# Patient Record
Sex: MALE | Race: WHITE | NOT HISPANIC OR LATINO | Employment: OTHER | URBAN - METROPOLITAN AREA
[De-identification: names, ages, dates, MRNs, and addresses within clinical notes are randomized per-mention and may not be internally consistent; named-entity substitution may affect disease eponyms.]

---

## 2021-02-04 ENCOUNTER — IMMUNIZATION (OUTPATIENT)
Dept: PEDIATRICS | Facility: CLINIC | Age: 81
End: 2021-02-04
Payer: COMMERCIAL

## 2021-02-04 PROCEDURE — 91300 PR COVID VAC PFIZER DIL RECON 30 MCG/0.3 ML IM: CPT

## 2021-02-04 PROCEDURE — 0001A PR COVID VAC PFIZER DIL RECON 30 MCG/0.3 ML IM: CPT

## 2021-02-25 ENCOUNTER — IMMUNIZATION (OUTPATIENT)
Dept: PEDIATRICS | Facility: CLINIC | Age: 81
End: 2021-02-25
Attending: INTERNAL MEDICINE
Payer: COMMERCIAL

## 2021-02-25 PROCEDURE — 0002A PR COVID VAC PFIZER DIL RECON 30 MCG/0.3 ML IM: CPT

## 2021-02-25 PROCEDURE — 91300 PR COVID VAC PFIZER DIL RECON 30 MCG/0.3 ML IM: CPT

## 2021-03-13 ENCOUNTER — HEALTH MAINTENANCE LETTER (OUTPATIENT)
Age: 81
End: 2021-03-13

## 2021-10-23 ENCOUNTER — HEALTH MAINTENANCE LETTER (OUTPATIENT)
Age: 81
End: 2021-10-23

## 2022-04-09 ENCOUNTER — HEALTH MAINTENANCE LETTER (OUTPATIENT)
Age: 82
End: 2022-04-09

## 2022-06-01 ENCOUNTER — TELEPHONE (OUTPATIENT)
Dept: GASTROENTEROLOGY | Facility: CLINIC | Age: 82
End: 2022-06-01
Payer: COMMERCIAL

## 2022-06-01 NOTE — TELEPHONE ENCOUNTER
[facility]    Phone Message    May a detailed message be left on voicemail: yes     Reason for Call: Other:     Jyotsna is calling on behalf of her  Дмитрий for appointment,  Scheduled 11/7/2022.  He has lost 35-40 pounds in the last 2-3 months due to throat issues.  Please reach out to Jyotsna to discuss options.    Action Taken: Message routed to:  [facility] ([facility] gastro    Travel Screening: Not Applicable

## 2022-06-02 NOTE — TELEPHONE ENCOUNTER
Spoke to patient's wife, Jyotsna, over the phone.     Patient was able to get worked in with a GI provider in [place] today. Wife is requesting the appointment with Dr. Webster scheduled for 11/7/22 to stay intact until they learn more at their appointment in [place] today. Wife states she will call back to cancel if the appointment is no longer needed.     Kathy Garcia RN on 6/2/2022 at 9:55 AM

## 2022-06-02 NOTE — TELEPHONE ENCOUNTER
Writer attempted to call patient regarding below. No answer and unable to leave a message due to no voice mail. Writer will attempt to reach patient again at later time.    Maria T Diaz RN on 6/2/2022 at 9:11 AM

## 2022-06-07 ENCOUNTER — TELEPHONE (OUTPATIENT)
Dept: SURGERY | Facility: OTHER | Age: 82
End: 2022-06-07
Payer: COMMERCIAL

## 2022-06-07 ENCOUNTER — HOSPITAL ENCOUNTER (EMERGENCY)
Facility: CLINIC | Age: 82
Discharge: HOME | End: 2022-06-07
Attending: FAMILY MEDICINE | Admitting: FAMILY MEDICINE
Payer: COMMERCIAL

## 2022-06-07 VITALS
RESPIRATION RATE: 18 BRPM | WEIGHT: 187.8 LBS | OXYGEN SATURATION: 98 % | TEMPERATURE: 98.7 F | HEART RATE: 89 BPM | SYSTOLIC BLOOD PRESSURE: 150 MMHG | DIASTOLIC BLOOD PRESSURE: 96 MMHG

## 2022-06-07 DIAGNOSIS — R63.4 WEIGHT LOSS: ICD-10-CM

## 2022-06-07 DIAGNOSIS — K44.9 HIATAL HERNIA: Primary | ICD-10-CM

## 2022-06-07 PROCEDURE — 99283 EMERGENCY DEPT VISIT LOW MDM: CPT

## 2022-06-07 PROCEDURE — 99282 EMERGENCY DEPT VISIT SF MDM: CPT | Performed by: FAMILY MEDICINE

## 2022-06-07 RX ORDER — METOCLOPRAMIDE 5 MG/1
5 TABLET ORAL 4 TIMES DAILY PRN
Qty: 15 TABLET | Refills: 0 | Status: SHIPPED | OUTPATIENT
Start: 2022-06-07 | End: 2022-06-20

## 2022-06-07 NOTE — ED TRIAGE NOTES
"Pt reports he has a \"massive hiatal hernia\" and it is pushing up on his left lung and causing some shortness of breath, he also reports he is being treated for H-pylori infection at this time, pain is only if has gas bubble and is not able to get it out     Triage Assessment     Row Name 06/07/22 1012       Triage Assessment (Adult)    Airway WDL WDL              "

## 2022-06-07 NOTE — TELEPHONE ENCOUNTER
Date of procedure: 6/10  Colonoscopy and EGD  Surgeon: Dr. Peters  Prep:miralax split   Packet:Colonoscopy/EGD instructions emailed to patient  [email or web address]   Date: 6/7/2022    Covid test done in [place] 6/7, will bring results      Surgery Scheduler

## 2022-06-07 NOTE — ED NOTES
Assessment deferred to MD. Patient denies pain or shortness of breath at this time. Discharge reviewed. Patient will return as needed. He is aware of Dr Peters's office calling him with EGD scheduled time.

## 2022-06-07 NOTE — ED PROVIDER NOTES
History     Chief Complaint   Patient presents with     Hernia complication     HPI  Billy Santana is a 81 year old male who presents to the emergency department because of concerns of a recent diagnosis of a large hiatal hernia.  Patient has been lost to care for a while and recently went to [facility] because he cannot get in with anyone any sooner because of concerns of increased belching and feeling like his stomach would not empty any get some discomfort until he can have a large burp.  He went to the [facility] clinic where they did blood work which was normal and an x-ray that showed a very large hiatal hernia.  They recommended for the patient to get set up for an endoscopy and colonoscopy.  Patient is here today because he finds it very inconvenient to go over to [place] and would like to see someone here locally.  He has an appointment set up to establish new care with the primary but does not until Friday.  They are not sure when the patient can get in for other testing and just does not think he can wait that long he was hoping to get that done faster through the emergency department.  Patient states yesterday in the morning he had a lot of discomfort but towards the evening and this morning he is currently pain-free.  At the [facility] clinic they did find that he was H. pylori positive and so they started him on antibiotics and omeprazole.  He has been taking all of these as prescribed.      Summary of clinic visit from 6/2/22:  Yovany Brown MD - 06/02/2022 1:45 PM CDT  Formatting of this note is different from the original.  SUBJECTIVE  Billy is a 81 Y male who is here today for evaluation of not feeling well. Patient is a new patient to the clinic. Has not had much contact with medical providers in the past. States that he is having some issues with his stomach and upper chest area where he has a hard time belching. This makes him not want to eat and get full easily. He has lost about 30  "pounds over the past several weeks. No vomiting. He has had new onset constipation but no blood in the stool or recent dark stools. No pain with swallowing. No difficulty swallowing. Not a smoker. No significant alcohol use.  Denies cough. Denies fever. Denies chills.    OBJECTIVE  /78 (BP Source: R arm, BP position: Sitting)  Pulse 93  Temp 99.5  F (37.5  C)  Ht 72\" (182.9 cm)  Wt 187 lb 9.6 oz (85.1 kg)  SpO2 97%  BMI 25.44 kg/m        ASSESSMENT & PLAN    Unexplained weight loss with general abdominal pain and early satiety. Etiology unclear at this point. We will start with a CBC, comprehensive metabolic, lipase, TSH, CRP, chest x-ray abdomen and pelvis and an H. pylori breath test. Recommended omeprazole which is called in. Recommended also over-the-counter simethicone.  Discussed that we may need to do more testing to determine the cause. Consideration should be given to CT scan of the abdomen pelvis, perhaps chest as well. Also consider EGD and colonoscopy..  He did voice understanding that more testing may be needed to fully determine the cause.  Electronically signed by Yovany Brown MD at 06/02/2022 2:20 PM CDT      Allergies:  Allergies   Allergen Reactions     No Known Drug Allergies        Problem List:    Patient Active Problem List    Diagnosis Date Noted     Health Care Home 12/28/2012     Priority: Cali Beauchamp RN-PHN  FPA / [facility]   [phone]    DX V65.8 REPLACED WITH [id number] HEALTH CARE HOME (04/08/2013)          Past Medical History:    Past Medical History:   Diagnosis Date     Hiatal hernia        Past Surgical History:    History reviewed. No pertinent surgical history.    Family History:    No family history on file.    Social History:  Marital Status:   [2]  Social History     Tobacco Use     Smoking status: Never Smoker     Smokeless tobacco: Never Used   Substance Use Topics     Alcohol use: Yes     Comment: rare     Drug " use: Never        Medications:    No current outpatient medications on file.        Review of Systems   All other systems reviewed and are negative.      Physical Exam   BP: (!) 150/96  Pulse: 89  Temp: 98.7  F (37.1  C)  Resp: 18  Weight: 85.2 kg (187 lb 12.8 oz)  SpO2: 98 %      Physical Exam  Vitals and nursing note reviewed.   Constitutional:       General: He is not in acute distress.     Appearance: He is well-developed. He is not diaphoretic.   HENT:      Head: Normocephalic and atraumatic.   Eyes:      Conjunctiva/sclera: Conjunctivae normal.   Cardiovascular:      Rate and Rhythm: Normal rate and regular rhythm.      Heart sounds: Normal heart sounds. No murmur heard.  Pulmonary:      Effort: Pulmonary effort is normal. No respiratory distress.      Breath sounds: Normal breath sounds. No stridor. No wheezing.      Comments: I can hear bowel sounds in the left chest wall area.  Abdominal:      General: Bowel sounds are normal. There is no distension.      Palpations: Abdomen is soft.      Tenderness: There is no abdominal tenderness. There is no guarding.   Musculoskeletal:         General: Normal range of motion.      Cervical back: Normal range of motion.   Skin:     General: Skin is warm and dry.      Findings: No rash.   Neurological:      Mental Status: He is alert and oriented to person, place, and time.   Psychiatric:         Judgment: Judgment normal.         ED Course        Labs done on 6-22 from [facility]:  BACTERIAL TESTS  [facility]  06/02/2022  Component      Helicobacter Pylori, Breath     Component 06/02/2022       Helicobacter Pylori, Breath Positive Abnormal           AUTOIMMUNE  [facility]  06/02/2022  Component      C-Reactive Protein     Component 06/02/2022       C-Reactive Protein 0.13       CBC           [facility]  06/02/2022  Component      WBC Count, Corrected   RBC Count   Hemoglobin   Hematocrit   MCV   MCH    MCHC   RDW   Platelets   MPV   % Neutrophils   % Lymphocytes   % Monocytes   % Eosinophils   % Basophils   Abs Neutrophils   Abs Lymphocytes   Abs Monocytes   Abs Eosinophils   Abs Basophils     Component 06/02/2022       WBC Count, Corrected 9.4   RBC Count 4.91   Hemoglobin 15.8   Hematocrit 45.9   MCV 93.5   MCH 32.1   MCHC 34.3   RDW 13.4   Platelets 271   MPV 8.7   % Neutrophils 77.0   % Lymphocytes 14.7 Low       % Monocytes 6.9   % Eosinophils 0.5   % Basophils 0.9   Abs Neutrophils 7.2   Abs Lymphocytes 1.4   Abs Monocytes 0.7   Abs Eosinophils 0.0   Abs Basophils 0.1       CHEM PANEL  [facility]  06/02/2022  Component      Sodium   Potassium   Chloride   CO2   Anion Gap   Creatinine   Blood Urea Nitrogen   BUN/Creatinine Ratio   Calcium   Glucose   eGFR   eCrCl (Rx) - Adults   Lipase     Component 06/02/2022 06/02/2022        Sodium 142 --   Potassium 4.5 --   Chloride 107 --   CO2 28 --   Anion Gap 11.5 --   Creatinine 1.06 --   Blood Urea Nitrogen 15.7 --   BUN/Creatinine Ratio 14.81 --   Calcium 9.9 --   Glucose 103 High     --   eGFR >60 --   eCrCl (Rx) - Adults 68.0 --   Lipase -- 49       CHEMISTRY    [facility]  06/02/2022  Component      Alanine Aminotransferase (ALT)   Bilirubin, Total     Component 06/02/2022       Alanine Aminotransferase (ALT) 10   Bilirubin, Total 1.2       LIVER PANEL  [facility]  06/02/2022  Component      Total Protein   Albumin   Globulin   Albumin/Globulin Ratio   Aspartate Aminotransferase (AST)   Alanine Aminotransferase (ALT)   Alkaline Phosphatase   Bilirubin, Total     Component 06/02/2022       Total Protein 7.2   Albumin 4.3   Globulin 2.9   Albumin/Globulin Ratio 1.5   Aspartate Aminotransferase (AST) 14   Alanine Aminotransferase (ALT) 10   Alkaline Phosphatase 89   Bilirubin, Total 1.2       THYROID  [facility]  06/02/2022  Component      TSH     Component 06/02/2022        TSH 1.35     EXAM:   XR CHEST PA AND LAT     INDICATION:   Unexplained weight loss     COMPARISON:   None.     FINDINGS:   PA and lateral views of the chest demonstrate hyperinflation of the lungs.   There is near complete position angle of the stomach in the thoracic cavity   with a large air-fluid level in the hiatal hernia.  No pleural air or fluid is   seen.  No focal pneumonia or suspicious pulmonary nodules are identified.   Thoracic aortic calcifications are present.     IMPRESSION:   Very large hiatal hernia.  Emphysematous changes.    Procedure Note    Delisa Damon MD - 06/04/2022   Formatting of this note might be different from the original.   EXAM:   XR CHEST PA AND LAT     INDICATION:   Unexplained weight loss     COMPARISON:   None.     FINDINGS:   PA and lateral views of the chest demonstrate hyperinflation of the lungs.   There is near complete position angle of the stomach in the thoracic cavity   with a large air-fluid level in the hiatal hernia.  No pleural air or fluid is   seen.  No focal pneumonia or suspicious pulmonary nodules are identified.   Thoracic aortic calcifications are present.     IMPRESSION:   Very large hiatal hernia.  Emphysematous changes.  Exam End: 06/02/22  3:09 PM    Specimen Collected: 06/04/22 10:16 AM Last Resulted: 06/04/22 10:17 AM   [facility]  Result Received: 06/07/22  9:51 AM                 Procedures      No results found for this or any previous visit (from the past 24 hour(s)).    Medications - No data to display     Labs and imaging were imported into this note from patient's visit from 5 days ago.  It does show a very large hiatal hernia, labs were normal other than a positive H. pylori infection.  Patient is currently pain-free.  I spoke to general surgery here, Dr. Peters, he will get the patient in here locally for an endoscopy and colonoscopy, should be able to be done here pretty soon.  At this point we  will discharge the patient home and add Reglan that he can use which may help with gastric emptying and some of his symptoms.  He will continue on his antibiotics for the H. pylori treatment and the omeprazole.  Patient has appoint with his primary care doctor at the end of the week.  According to surgery, we did not need to do a CT scan emergently but primary may want to consider this as an outpatient for further work-up for this unexplained weight loss.  There are no emergent medical conditions to be treated at this time patient safe to be discharged home.    Assessments & Plan (with Medical Decision Making)  Hiatal hernia     I have reviewed the nursing notes.    I have reviewed the findings, diagnosis, plan and need for follow up with the patient.              6/7/2022   [facility] EMERGENCY DEPT     Andre Fisher MD  06/07/22 1283

## 2022-06-07 NOTE — DISCHARGE INSTRUCTIONS
1.  Continue to eat very small, frequent meals.  You can use the Reglan if you get this abdominal pressure and bloating again to see if this helps with gastric emptying and your symptoms.  2.  Someone from Dr. Peters's office should be contacting you today or tomorrow with the time and date for your endoscopy and colonoscopy.  If you do not hear from them, please call their office tomorrow.

## 2022-06-07 NOTE — TELEPHONE ENCOUNTER
Patients wife Jyotsna returned call. I was unsure how to proceed based on the information I had but I told her it appeared they were fitting patient in on 6/10 for procedures but I would need to confirm. Sent message to Jazmin YOO

## 2022-06-08 ENCOUNTER — MYC MEDICAL ADVICE (OUTPATIENT)
Dept: GASTROENTEROLOGY | Facility: CLINIC | Age: 82
End: 2022-06-08
Payer: COMMERCIAL

## 2022-06-08 RX ORDER — CLARITHROMYCIN 500 MG
500 TABLET ORAL 2 TIMES DAILY
COMMUNITY
Start: 2022-06-03 | End: 2022-06-22

## 2022-06-08 RX ORDER — AMOXICILLIN 500 MG/1
1000 CAPSULE ORAL 2 TIMES DAILY
COMMUNITY
Start: 2022-06-03 | End: 2022-06-22

## 2022-06-09 ENCOUNTER — HOSPITAL ENCOUNTER (EMERGENCY)
Facility: CLINIC | Age: 82
Discharge: HOME | End: 2022-06-10
Attending: FAMILY MEDICINE | Admitting: FAMILY MEDICINE
Payer: COMMERCIAL

## 2022-06-09 ENCOUNTER — NURSE TRIAGE (OUTPATIENT)
Dept: NURSING | Facility: CLINIC | Age: 82
End: 2022-06-09
Payer: COMMERCIAL

## 2022-06-09 ENCOUNTER — HOSPITAL ENCOUNTER (EMERGENCY)
Facility: CLINIC | Age: 82
Discharge: HOME | End: 2022-06-09
Attending: EMERGENCY MEDICINE | Admitting: EMERGENCY MEDICINE
Payer: COMMERCIAL

## 2022-06-09 VITALS
SYSTOLIC BLOOD PRESSURE: 128 MMHG | HEIGHT: 72 IN | DIASTOLIC BLOOD PRESSURE: 83 MMHG | WEIGHT: 187 LBS | BODY MASS INDEX: 25.33 KG/M2 | OXYGEN SATURATION: 97 % | HEART RATE: 91 BPM | TEMPERATURE: 98.7 F | RESPIRATION RATE: 19 BRPM

## 2022-06-09 DIAGNOSIS — R31.0 GROSS HEMATURIA: ICD-10-CM

## 2022-06-09 DIAGNOSIS — R33.9 URINARY RETENTION: ICD-10-CM

## 2022-06-09 DIAGNOSIS — R55 VASOVAGAL NEAR SYNCOPE: ICD-10-CM

## 2022-06-09 LAB
ALBUMIN SERPL-MCNC: 3.8 G/DL (ref 3.4–5)
ALBUMIN UR-MCNC: 100 MG/DL
ALBUMIN UR-MCNC: NEGATIVE MG/DL
ALP SERPL-CCNC: 82 U/L (ref 40–150)
ALT SERPL W P-5'-P-CCNC: 18 U/L (ref 0–70)
ANION GAP SERPL CALCULATED.3IONS-SCNC: 10 MMOL/L (ref 3–14)
APPEARANCE UR: CLEAR
APPEARANCE UR: CLEAR
AST SERPL W P-5'-P-CCNC: 28 U/L (ref 0–45)
BACTERIA #/AREA URNS HPF: ABNORMAL /HPF
BASOPHILS # BLD AUTO: 0.1 10E3/UL (ref 0–0.2)
BASOPHILS NFR BLD AUTO: 0 %
BILIRUB SERPL-MCNC: 1.8 MG/DL (ref 0.2–1.3)
BILIRUB UR QL STRIP: NEGATIVE
BILIRUB UR QL STRIP: NEGATIVE
BUN SERPL-MCNC: 14 MG/DL (ref 7–30)
CALCIUM SERPL-MCNC: 8.8 MG/DL (ref 8.5–10.1)
CHLORIDE BLD-SCNC: 106 MMOL/L (ref 94–109)
CO2 SERPL-SCNC: 23 MMOL/L (ref 20–32)
COLOR UR AUTO: ABNORMAL
COLOR UR AUTO: YELLOW
CREAT SERPL-MCNC: 1.14 MG/DL (ref 0.66–1.25)
EOSINOPHIL # BLD AUTO: 0.1 10E3/UL (ref 0–0.7)
EOSINOPHIL NFR BLD AUTO: 1 %
ERYTHROCYTE [DISTWIDTH] IN BLOOD BY AUTOMATED COUNT: 12.3 % (ref 10–15)
GFR SERPL CREATININE-BSD FRML MDRD: 65 ML/MIN/1.73M2
GLUCOSE BLD-MCNC: 124 MG/DL (ref 70–99)
GLUCOSE UR STRIP-MCNC: 50 MG/DL
GLUCOSE UR STRIP-MCNC: NEGATIVE MG/DL
HCT VFR BLD AUTO: 44.8 % (ref 40–53)
HGB BLD-MCNC: 15.7 G/DL (ref 13.3–17.7)
HGB UR QL STRIP: ABNORMAL
HGB UR QL STRIP: NEGATIVE
IMM GRANULOCYTES # BLD: 0 10E3/UL
IMM GRANULOCYTES NFR BLD: 0 %
KETONES UR STRIP-MCNC: 20 MG/DL
KETONES UR STRIP-MCNC: 5 MG/DL
LACTATE SERPL-SCNC: 1.7 MMOL/L (ref 0.7–2)
LEUKOCYTE ESTERASE UR QL STRIP: NEGATIVE
LEUKOCYTE ESTERASE UR QL STRIP: NEGATIVE
LYMPHOCYTES # BLD AUTO: 2 10E3/UL (ref 0.8–5.3)
LYMPHOCYTES NFR BLD AUTO: 14 %
MCH RBC QN AUTO: 32.1 PG (ref 26.5–33)
MCHC RBC AUTO-ENTMCNC: 35 G/DL (ref 31.5–36.5)
MCV RBC AUTO: 92 FL (ref 78–100)
MONOCYTES # BLD AUTO: 1.2 10E3/UL (ref 0–1.3)
MONOCYTES NFR BLD AUTO: 8 %
MUCOUS THREADS #/AREA URNS LPF: PRESENT /LPF
MUCOUS THREADS #/AREA URNS LPF: PRESENT /LPF
NEUTROPHILS # BLD AUTO: 10.4 10E3/UL (ref 1.6–8.3)
NEUTROPHILS NFR BLD AUTO: 77 %
NITRATE UR QL: NEGATIVE
NITRATE UR QL: NEGATIVE
NRBC # BLD AUTO: 0 10E3/UL
NRBC BLD AUTO-RTO: 0 /100
PH UR STRIP: 5 [PH] (ref 5–7)
PH UR STRIP: 6 [PH] (ref 5–7)
PLATELET # BLD AUTO: 329 10E3/UL (ref 150–450)
POTASSIUM BLD-SCNC: 4.1 MMOL/L (ref 3.4–5.3)
PROT SERPL-MCNC: 7.1 G/DL (ref 6.8–8.8)
RBC # BLD AUTO: 4.89 10E6/UL (ref 4.4–5.9)
RBC URINE: 1 /HPF
RBC URINE: >182 /HPF
SODIUM SERPL-SCNC: 139 MMOL/L (ref 133–144)
SP GR UR STRIP: 1.01 (ref 1–1.03)
SP GR UR STRIP: 1.01 (ref 1–1.03)
SQUAMOUS EPITHELIAL: <1 /HPF
UROBILINOGEN UR STRIP-MCNC: NORMAL MG/DL
UROBILINOGEN UR STRIP-MCNC: NORMAL MG/DL
WBC # BLD AUTO: 13.7 10E3/UL (ref 4–11)
WBC URINE: 1 /HPF
WBC URINE: 8 /HPF

## 2022-06-09 PROCEDURE — 99284 EMERGENCY DEPT VISIT MOD MDM: CPT | Mod: 25 | Performed by: EMERGENCY MEDICINE

## 2022-06-09 PROCEDURE — 85025 COMPLETE CBC W/AUTO DIFF WBC: CPT | Performed by: FAMILY MEDICINE

## 2022-06-09 PROCEDURE — 99284 EMERGENCY DEPT VISIT MOD MDM: CPT | Performed by: FAMILY MEDICINE

## 2022-06-09 PROCEDURE — 81001 URINALYSIS AUTO W/SCOPE: CPT | Performed by: EMERGENCY MEDICINE

## 2022-06-09 PROCEDURE — 51702 INSERT TEMP BLADDER CATH: CPT | Performed by: EMERGENCY MEDICINE

## 2022-06-09 PROCEDURE — 81001 URINALYSIS AUTO W/SCOPE: CPT | Performed by: FAMILY MEDICINE

## 2022-06-09 PROCEDURE — 36415 COLL VENOUS BLD VENIPUNCTURE: CPT | Performed by: FAMILY MEDICINE

## 2022-06-09 PROCEDURE — 99284 EMERGENCY DEPT VISIT MOD MDM: CPT | Performed by: EMERGENCY MEDICINE

## 2022-06-09 PROCEDURE — 51798 US URINE CAPACITY MEASURE: CPT | Performed by: EMERGENCY MEDICINE

## 2022-06-09 PROCEDURE — 250N000009 HC RX 250: Performed by: EMERGENCY MEDICINE

## 2022-06-09 PROCEDURE — 258N000003 HC RX IP 258 OP 636: Performed by: FAMILY MEDICINE

## 2022-06-09 PROCEDURE — 80053 COMPREHEN METABOLIC PANEL: CPT | Performed by: FAMILY MEDICINE

## 2022-06-09 PROCEDURE — 99283 EMERGENCY DEPT VISIT LOW MDM: CPT | Mod: 25,27 | Performed by: FAMILY MEDICINE

## 2022-06-09 PROCEDURE — 96360 HYDRATION IV INFUSION INIT: CPT | Performed by: FAMILY MEDICINE

## 2022-06-09 PROCEDURE — 83605 ASSAY OF LACTIC ACID: CPT | Performed by: FAMILY MEDICINE

## 2022-06-09 RX ORDER — LIDOCAINE HYDROCHLORIDE 20 MG/ML
JELLY TOPICAL ONCE
Status: COMPLETED | OUTPATIENT
Start: 2022-06-09 | End: 2022-06-09

## 2022-06-09 RX ORDER — SODIUM CHLORIDE 9 MG/ML
INJECTION, SOLUTION INTRAVENOUS CONTINUOUS
Status: DISCONTINUED | OUTPATIENT
Start: 2022-06-09 | End: 2022-06-10 | Stop reason: HOSPADM

## 2022-06-09 RX ADMIN — SODIUM CHLORIDE 1000 ML: 9 INJECTION, SOLUTION INTRAVENOUS at 22:25

## 2022-06-09 RX ADMIN — LIDOCAINE HYDROCHLORIDE: 20 JELLY TOPICAL at 16:16

## 2022-06-09 ASSESSMENT — ENCOUNTER SYMPTOMS
FEVER: 0
ABDOMINAL PAIN: 0
HEMATURIA: 1

## 2022-06-09 NOTE — H&P
[facility] History and Physical    Billy Santana MRN# [id number]   Age: 81 year old YOB: 1940     Date of Admission:  (Not on file)    Home clinic: [facility]  Primary care provider: No Ref-Primary, Physician          Impression and Plan:   Impression:   Hiatal hernia [K44.9]  Weight loss [R63.4]  No prior endo in system      Plan:   Proceed to EGD and Colonoscopy as planned.  The procedure, risks(bleeding, perforation), benefits and alternatives were discussed and the patient agrees to proceed. Cleared for Anesthesia             Chief Complaint:   Hiatal hernia [K44.9]  Weight loss [R63.4]    History is obtained from the patient          History of Present Illness:   This 81 year old male is being seen at this time for evaluation for chest pain. Dx hiatal hernia.  Lost weight - 40 lbs last 2 months.  Able to eat until 2 months ago.  Has problems belching.  No prior colonoscopy.  On tx for H pylori.              Past Medical History:     Past Medical History:   Diagnosis Date     Hiatal hernia             Past Surgical History:   History reviewed. No pertinent surgical history.         Social History:     Social History     Tobacco Use     Smoking status: Never Smoker     Smokeless tobacco: Never Used   Substance Use Topics     Alcohol use: Yes     Comment: rare            Family History:   History reviewed. No pertinent family history.         Immunizations:     VACCINE/DOSE   Diptheria   DPT   DTAP   HBIG   Hepatitis A   Hepatitis B   HIB   Influenza   Measles   Meningococcal   MMR   Mumps   Pneumococcal   Polio   Rubella   Small Pox   TDAP   Varicella   Zoster            Allergies:     Allergies   Allergen Reactions     No Known Drug Allergies             Medications:     No current facility-administered medications for this encounter.     Current Outpatient Medications   Medication Sig     amoxicillin (AMOXIL) 500 MG capsule TAKE 2 CAPSULES BY MOUTH ONCE DAILY IN  THE MORNING AND 2 ONCE DAILY IN THE EVENING FOR 14 DAYS     clarithromycin (BIAXIN) 500 MG tablet TAKE 1 TABLET BY MOUTH EVERY 12 HOURS FOR 14 DAYS     metoclopramide (REGLAN) 5 MG tablet Take 1 tablet (5 mg) by mouth 4 times daily as needed (nausea or bloating)     omeprazole (PRILOSEC) 20 MG DR capsule TAKE 1 CAPSULE (20 MG) BY MOUTH ONCE DAILY BEFORE BREAKFAST             Review of Systems:   The review of systems was positive for the following findings.  None.  The remainder of the review of systems was unremarkable.          Physical Exam:   All vitals have been reviewed  There were no vitals taken for this visit.  No intake or output data in the 24 hours ending 06/09/22 1010  SHEENT examination revealed NC/AT EOMI.  Examination of the chest revealed CTA.  Examination of the heart revealed RRR.  Examination of the abdomen revealed soft, non tender.  The neuromuscular examination was None.          Data:   All laboratory data reviewed  No results found for any visits on 06/10/22.       Andres Peters MD, FACS

## 2022-06-09 NOTE — ED TRIAGE NOTES
Is voiding but states he is having trouble voiding. States he has a colonoscopy tomorrow concerned he wont be able to hold the amount during surgery and will damage his bladder.  Feels like he has to pee urgently when he does

## 2022-06-09 NOTE — TELEPHONE ENCOUNTER
Huddled with Dr. Lock, who said patient needs to go to the emergency room, as it sounds like he needs a catheter.   Spoke to patient and gave message. He said okay and he will go to the ER.

## 2022-06-09 NOTE — DISCHARGE INSTRUCTIONS
Sahu catheter until you see your doctor next week.  Urine does not look infected.  I spoke to Dr. Peters and he states he can still proceed with the colonoscopy tomorrow so you should start the bowel prep tonight.

## 2022-06-09 NOTE — ED PROVIDER NOTES
History     Chief Complaint   Patient presents with     Urinary Retention     HPI  Billy Santana is a 81 year old male who presents with concerns for possible urinary retention.  Patient states he has problems where he does not think he is completely emptying his bladder.  He feels the urgency to go but does not feel he completely empties thereafter.  He has not noticed any blood in the urine and is not painful to urinate.  No flank pain.  No history of pyelonephritis or kidney stone.  He is unaware of prostatic hypertrophy and states he used to be checked for this every year but its been a number of years since they have checked him.  Recently he developed upper abdominal discomfort with anorexia.  Thought to have H. pylori and was placed on amoxicillin, Bactrim, and PPI.  He is due to have colonoscopy tomorrow and was concerned about his bowel prep causing worsening urinary retention.  He denies fever or chills.  No lightheadedness or generalized weakness.    Allergies:  Allergies   Allergen Reactions     No Known Drug Allergies        Problem List:    Patient Active Problem List    Diagnosis Date Noted     Health Care Home 12/28/2012     Priority: Cali Beauchamp RN-PHN  [facility] / [facility]   [phone]    DX V65.8 REPLACED WITH [id number] HEALTH CARE HOME (04/08/2013)          Past Medical History:    Past Medical History:   Diagnosis Date     Hiatal hernia        Past Surgical History:    No past surgical history on file.    Family History:    No family history on file.    Social History:  Marital Status:   [2]  Social History     Tobacco Use     Smoking status: Never Smoker     Smokeless tobacco: Never Used   Substance Use Topics     Alcohol use: Yes     Comment: rare     Drug use: Never        Medications:    amoxicillin (AMOXIL) 500 MG capsule  clarithromycin (BIAXIN) 500 MG tablet  metoclopramide (REGLAN) 5 MG tablet  omeprazole (PRILOSEC) 20 MG   capsule          Review of Systems all other systems are reviewed and are negative.    Physical Exam   BP: 128/83  Pulse: 91  Temp: 98.7  F (37.1  C)  Resp: 19  Height: 182.9 cm (6')  Weight: 84.8 kg (187 lb)  SpO2: 97 %      Physical Exam General Pleasant cooperative male who does not look toxic or ill.  He does not have CVA tenderness.  His not overly distended on abdominal exam without organomegaly.    ED Course                 Procedures              Critical Care time:  none               Results for orders placed or performed during the hospital encounter of 06/09/22 (from the past 24 hour(s))   UA with Microscopic reflex to Culture    Specimen: Urine, Clean Catch   Result Value Ref Range    Color Urine Yellow Colorless, Straw, Light Yellow, Yellow    Appearance Urine Clear Clear    Glucose Urine Negative Negative mg/dL    Bilirubin Urine Negative Negative    Ketones Urine 20  (A) Negative mg/dL    Specific Gravity Urine 1.013 1.003 - 1.035    Blood Urine Negative Negative    pH Urine 6.0 5.0 - 7.0    Protein Albumin Urine Negative Negative mg/dL    Urobilinogen Urine Normal Normal, 2.0 mg/dL    Nitrite Urine Negative Negative    Leukocyte Esterase Urine Negative Negative    Mucus Urine Present (A) None Seen /LPF    RBC Urine 1 <=2 /HPF    WBC Urine 1 <=5 /HPF    Squamous Epithelials Urine <1 <=1 /HPF    Narrative    Urine Culture not indicated       Medications   lidocaine (XYLOCAINE) 2 % external gel ( Topical Given 6/9/22 1616)     Was able to urinate a small amount.  Bladder scan thereafter showed almost 300 cc so a catheter was placed at his request.  I did speak to the surgeon that is going to perform his colonoscopy and he states that is okay to proceed with a colonoscopy and prep.  His urine was clear and did not look infectious.  Assessments & Plan (with Medical Decision Making)   Billy Santana is a 81 year old male who presents with concerns for possible urinary retention.  Patient states he has  problems where he does not think he is completely emptying his bladder.  He feels the urgency to go but does not feel he completely empties thereafter.  He has not noticed any blood in the urine and is not painful to urinate.  No flank pain.  No history of pyelonephritis or kidney stone.  He is unaware of prostatic hypertrophy and states he used to be checked for this every year but its been a number of years since they have checked him.  Recently he developed upper abdominal discomfort with anorexia.  Thought to have H. pylori and was placed on amoxicillin, Bactrim, and PPI.  He is due to have colonoscopy tomorrow and was concerned about his bowel prep causing worsening urinary retention.  He denies fever or chills.  No lightheadedness or generalized weakness.  On presentation patient was afebrile and vitally stable.  Did not have CVA tenderness and his abdominal exam did not reveal organomegaly or distended bladder.  He did however had some urinary retention.  His urine was clear of infection.  Catheter was placed at his request.  I spoke to the surgeon that performed his colonoscopy and is okay for him to proceed with a colonoscopy and prep.  Reasons to return to the ER were discussed.  I have reviewed the nursing notes.    I have reviewed the findings, diagnosis, plan and need for follow up with the patient.       Discharge Medication List as of 6/9/2022  4:10 PM          Final diagnoses:   Urinary retention       6/9/2022   [facility] EMERGENCY DEPT     Ernesto Mendoza MD  06/09/22 2607

## 2022-06-09 NOTE — ED NOTES
Pt tolerated cath appropriately. Urine returned 500 ML. RN educated pt and wife about cath cares - provided leg bag to pt and educated. Sahu back to take home to use at night - educated about use of both bags and cath care. Pt denies pain or discomfort.

## 2022-06-09 NOTE — TELEPHONE ENCOUNTER
"      Reason for Disposition    Unable to urinate (or only a few drops) and bladder feels very full    Additional Information    Negative: Shock suspected (e.g., cold/pale/clammy skin, too weak to stand, low BP, rapid pulse)    Negative: Sounds like a life-threatening emergency to the triager    Answer Assessment - Initial Assessment Questions  1. SEVERITY: \"How bad is the pain?\"  (e.g., Scale 1-10; mild, moderate, or severe)    - MILD (1-3):             Pain is from bladder fullness  2. FREQUENCY: \"How many times have you had painful urination today?\"      Every 2 hours - trouble initiating, and does not empty  3. PATTERN: \"Is pain present every time you urinate or just sometimes?\"       Pain is full bladder  4. ONSET: \"When did the painful urination start?\"       3-4 days  5. FEVER: \"Do you have a fever?\" If so, ask: \"What is your temperature, how was it measured, and when did it start?\"   No fever nor chills  6. PAST UTI: \"Have you had a urine infection before?\" If so, ask: \"When was the last time?\" and \"What happened that time?\"       This issue has not been a problem before  7. CAUSE: \"What do you think is causing the painful urination?\"    Not sure why I can't empty my bladder  8. OTHER SYMPTOMS: \"Do you have any other symptoms?\" (e.g., flank pain, penile discharge, scrotal pain, blood in urine)     No discharge, no blood in urine, urine color is light yellow. Initial symptoms did start after \" sexy dream\",3 days ago- he relates that the dream was about his wife. He has no penile nor scrotal pain.    Answer Assessment - Initial Assessment Questions  1. SYMPTOM: \"What's the main symptom you're concerned about?\" (e.g., frequency, incontinence)      Inability to empty bladder  2. ONSET:  3 days ago  3. PAIN: \"Is there any pain?\"Mild/ fullness  4. CAUSE: \"What do you think is causing the symptoms?\"   Unknown  5. OTHER SYMPTOMS: \"Do you have any other symptoms?\" (e.g., fever, flank pain, blood in urine, pain with " "urination)  NO    Protocols used: URINATION PAIN - MALE-A-OH, URINARY SYMPTOMS-A-OH  Nurse Triage SBAR    Is this a 2nd Level Triage? YES, LICENSED PRACTITIONER REVIEW IS REQUIRED    Situation:Never before had this issue:  - 3 days ago he feels like he cannot completely void/ empty bladder.  -He needs to have activity to get urine stream initiated  - states\" I have typical old man prostate issues, but this is different\".  -He is worried about completing colonoscopy.  -No fever, no chills.  -On antibiotics for recent stomach issues> H pylori.  Amoxicillin and Biaxin.      Background:  Seen in ED [facility] 6/7/22>very large hiatal hernia, labs were normal other than a positive H. pylori infection.   Current c/o: 3 day hx of decreasing ability to empty bladder without accompanying fever, chills, hematuria or dysuria.  Assessment:See today in office with PCP permission or Ed if PCP unable to manage.    Protocol Recommended Disposition:   Go To ED/UCC Now (Or To Office With PCP Approval)    Recommendation: Appt in Primary care vs ED     Routed to provider team high priority    Does the patient meet one of the following criteria for ADS visit consideration? No    "

## 2022-06-10 ENCOUNTER — TELEPHONE (OUTPATIENT)
Dept: UROLOGY | Facility: CLINIC | Age: 82
End: 2022-06-10

## 2022-06-10 ENCOUNTER — HOSPITAL ENCOUNTER (OUTPATIENT)
Facility: CLINIC | Age: 82
Discharge: HOME | End: 2022-06-10
Attending: SPECIALIST | Admitting: SPECIALIST
Payer: COMMERCIAL

## 2022-06-10 ENCOUNTER — ANESTHESIA EVENT (OUTPATIENT)
Dept: GASTROENTEROLOGY | Facility: CLINIC | Age: 82
End: 2022-06-10
Payer: COMMERCIAL

## 2022-06-10 ENCOUNTER — ANESTHESIA (OUTPATIENT)
Dept: GASTROENTEROLOGY | Facility: CLINIC | Age: 82
End: 2022-06-10
Payer: COMMERCIAL

## 2022-06-10 VITALS
HEART RATE: 74 BPM | TEMPERATURE: 98.1 F | RESPIRATION RATE: 18 BRPM | DIASTOLIC BLOOD PRESSURE: 77 MMHG | OXYGEN SATURATION: 95 % | BODY MASS INDEX: 25.36 KG/M2 | WEIGHT: 187 LBS | SYSTOLIC BLOOD PRESSURE: 119 MMHG

## 2022-06-10 VITALS
TEMPERATURE: 98.3 F | DIASTOLIC BLOOD PRESSURE: 49 MMHG | RESPIRATION RATE: 16 BRPM | SYSTOLIC BLOOD PRESSURE: 93 MMHG | HEART RATE: 67 BPM | OXYGEN SATURATION: 95 %

## 2022-06-10 DIAGNOSIS — K44.9 HIATAL HERNIA: ICD-10-CM

## 2022-06-10 DIAGNOSIS — R63.4 WEIGHT LOSS: ICD-10-CM

## 2022-06-10 LAB
COLONOSCOPY: NORMAL
UPPER GI ENDOSCOPY: NORMAL

## 2022-06-10 PROCEDURE — 88305 TISSUE EXAM BY PATHOLOGIST: CPT | Mod: 26 | Performed by: PATHOLOGY

## 2022-06-10 PROCEDURE — 258N000003 HC RX IP 258 OP 636: Performed by: NURSE ANESTHETIST, CERTIFIED REGISTERED

## 2022-06-10 PROCEDURE — 45385 COLONOSCOPY W/LESION REMOVAL: CPT | Mod: PT | Performed by: SPECIALIST

## 2022-06-10 PROCEDURE — 250N000011 HC RX IP 250 OP 636: Performed by: NURSE ANESTHETIST, CERTIFIED REGISTERED

## 2022-06-10 PROCEDURE — 250N000009 HC RX 250: Performed by: NURSE ANESTHETIST, CERTIFIED REGISTERED

## 2022-06-10 PROCEDURE — 370N000017 HC ANESTHESIA TECHNICAL FEE, PER MIN: Performed by: SPECIALIST

## 2022-06-10 PROCEDURE — 43239 EGD BIOPSY SINGLE/MULTIPLE: CPT | Performed by: SPECIALIST

## 2022-06-10 PROCEDURE — 45380 COLONOSCOPY AND BIOPSY: CPT | Performed by: SPECIALIST

## 2022-06-10 PROCEDURE — 88342 IMHCHEM/IMCYTCHM 1ST ANTB: CPT | Mod: 26 | Performed by: PATHOLOGY

## 2022-06-10 PROCEDURE — 88305 TISSUE EXAM BY PATHOLOGIST: CPT | Mod: TC | Performed by: SPECIALIST

## 2022-06-10 PROCEDURE — 45385 COLONOSCOPY W/LESION REMOVAL: CPT | Performed by: SPECIALIST

## 2022-06-10 PROCEDURE — 43239 EGD BIOPSY SINGLE/MULTIPLE: CPT | Mod: 51 | Performed by: SPECIALIST

## 2022-06-10 PROCEDURE — 93005 ELECTROCARDIOGRAM TRACING: CPT | Mod: XU

## 2022-06-10 RX ORDER — SODIUM CHLORIDE, SODIUM LACTATE, POTASSIUM CHLORIDE, CALCIUM CHLORIDE 600; 310; 30; 20 MG/100ML; MG/100ML; MG/100ML; MG/100ML
INJECTION, SOLUTION INTRAVENOUS CONTINUOUS
Status: DISCONTINUED | OUTPATIENT
Start: 2022-06-10 | End: 2022-06-10 | Stop reason: HOSPADM

## 2022-06-10 RX ORDER — LIDOCAINE HYDROCHLORIDE 20 MG/ML
INJECTION, SOLUTION INFILTRATION; PERINEURAL PRN
Status: DISCONTINUED | OUTPATIENT
Start: 2022-06-10 | End: 2022-06-10

## 2022-06-10 RX ORDER — PROPOFOL 10 MG/ML
INJECTION, EMULSION INTRAVENOUS CONTINUOUS PRN
Status: DISCONTINUED | OUTPATIENT
Start: 2022-06-10 | End: 2022-06-10

## 2022-06-10 RX ORDER — PROPOFOL 10 MG/ML
INJECTION, EMULSION INTRAVENOUS PRN
Status: DISCONTINUED | OUTPATIENT
Start: 2022-06-10 | End: 2022-06-10

## 2022-06-10 RX ORDER — LIDOCAINE 40 MG/G
CREAM TOPICAL
Status: DISCONTINUED | OUTPATIENT
Start: 2022-06-10 | End: 2022-06-10

## 2022-06-10 RX ORDER — LIDOCAINE 40 MG/G
CREAM TOPICAL
Status: DISCONTINUED | OUTPATIENT
Start: 2022-06-10 | End: 2022-06-10 | Stop reason: HOSPADM

## 2022-06-10 RX ADMIN — PROPOFOL 150 MCG/KG/MIN: 10 INJECTION, EMULSION INTRAVENOUS at 10:03

## 2022-06-10 RX ADMIN — PROPOFOL 60 MG: 10 INJECTION, EMULSION INTRAVENOUS at 10:03

## 2022-06-10 RX ADMIN — SODIUM CHLORIDE, POTASSIUM CHLORIDE, SODIUM LACTATE AND CALCIUM CHLORIDE: 600; 310; 30; 20 INJECTION, SOLUTION INTRAVENOUS at 09:53

## 2022-06-10 RX ADMIN — LIDOCAINE HYDROCHLORIDE 60 MG: 20 INJECTION, SOLUTION INFILTRATION; PERINEURAL at 10:03

## 2022-06-10 ASSESSMENT — ENCOUNTER SYMPTOMS: DYSRHYTHMIAS: 1

## 2022-06-10 NOTE — ED NOTES
Reviewed discharge instruction, verbalized understanding. No questions or concerns. IV removed. VS reassessed.

## 2022-06-10 NOTE — ED NOTES
Pt wife out to RN desk and states pt was feeling nauseated. RN to pt room and pt was very diaphoretic, c/o nausea, sweating, pale, 'felt like I was going to pass out'. Pt has been up x2 to have a BM. Pt stated there looked like there was blood in stool but continued to clean bottom and was having bright red blood with wiping, hx of hemorrhoid. BP hypotensive. RN started IV, blood drawn and sent to lab. VS monitoring. IV fluid bolus infusing.

## 2022-06-10 NOTE — ED PROVIDER NOTES
History     Chief Complaint   Patient presents with     Catheter Problem     HPI  Billy Santana is a 81 year old male who presents to the ED tonight with gross hematuria.  He was seen earlier in the day with urinary retention and had a catheter placed.  There was no sign of infection.  Nursing placed the catheter states that there was a's small clot that came out right away but then the urine was clear.  He drained about 1500 cc.  He just started his colonoscopy prep this evening but has not had any results as of yet.  Having both upper and lower endoscopies tomorrow.  He denies any abdominal or flank pain.  No nausea or vomiting.    Here with his wife.  Lives in [place], about 20 miles away.    Allergies:  Allergies   Allergen Reactions     No Known Drug Allergies        Problem List:    Patient Active Problem List    Diagnosis Date Noted     Health Care Home 12/28/2012     Priority: Cali Beauchamp RN-PHN  FPA / [facility]   [phone]    DX V65.8 REPLACED WITH [id number] C[facility] HOME (04/08/2013)          Past Medical History:    Past Medical History:   Diagnosis Date     Hiatal hernia        Past Surgical History:    History reviewed. No pertinent surgical history.    Family History:    History reviewed. No pertinent family history.    Social History:  Marital Status:   [2]  Social History     Tobacco Use     Smoking status: Never Smoker     Smokeless tobacco: Never Used   Substance Use Topics     Alcohol use: Yes     Comment: rare     Drug use: Never        Medications:    amoxicillin (AMOXIL) 500 MG capsule  clarithromycin (BIAXIN) 500 MG tablet  metoclopramide (REGLAN) 5 MG tablet  omeprazole (PRILOSEC) 20 MG DR capsule          Review of Systems   Constitutional: Negative for fever.   Gastrointestinal: Negative for abdominal pain.   Genitourinary: Positive for hematuria.       Physical Exam   BP: 133/83  Pulse: 101  Temp: 98.1  F (36.7  C)  Resp: 18  Weight:  84.8 kg (187 lb)  SpO2: 95 %      Physical Exam  Constitutional:       General: He is not in acute distress.     Appearance: Normal appearance.   Pulmonary:      Effort: Pulmonary effort is normal. No respiratory distress.   Abdominal:      General: Bowel sounds are normal. There is no distension.      Palpations: Abdomen is soft.      Tenderness: There is no abdominal tenderness.   Genitourinary:     Comments: Sahu catheter in place.  Hematuria in the bag.  No clots noted  Neurological:      General: No focal deficit present.      Mental Status: He is alert and oriented to person, place, and time.   Psychiatric:         Mood and Affect: Mood normal.         ED Course            Results for orders placed or performed during the hospital encounter of 06/09/22 (from the past 24 hour(s))   UA with Microscopic reflex to Culture    Specimen: Urine, Catheter   Result Value Ref Range    Color Urine Ivett (A) Colorless, Straw, Light Yellow, Yellow    Appearance Urine Clear Clear    Glucose Urine 50  (A) Negative mg/dL    Bilirubin Urine Negative Negative    Ketones Urine 5  (A) Negative mg/dL    Specific Gravity Urine 1.015 1.003 - 1.035    Blood Urine Large (A) Negative    pH Urine 5.0 5.0 - 7.0    Protein Albumin Urine 100  (A) Negative mg/dL    Urobilinogen Urine Normal Normal, 2.0 mg/dL    Nitrite Urine Negative Negative    Leukocyte Esterase Urine Negative Negative    Bacteria Urine Few (A) None Seen /HPF    Mucus Urine Present (A) None Seen /LPF    RBC Urine >182 (H) <=2 /HPF    WBC Urine 8 (H) <=5 /HPF    Narrative    Urine Culture not indicated   CBC with platelets differential    Narrative    The following orders were created for panel order CBC with platelets differential.  Procedure                               Abnormality         Status                     ---------                               -----------         ------                     CBC with platelets and d...[[id number]]  Abnormal            Final  result                 Please view results for these tests on the individual orders.   Lactic acid whole blood   Result Value Ref Range    Lactic Acid 1.7 0.7 - 2.0 mmol/L   Comprehensive metabolic panel   Result Value Ref Range    Sodium 139 133 - 144 mmol/L    Potassium 4.1 3.4 - 5.3 mmol/L    Chloride 106 94 - 109 mmol/L    Carbon Dioxide (CO2) 23 20 - 32 mmol/L    Anion Gap 10 3 - 14 mmol/L    Urea Nitrogen 14 7 - 30 mg/dL    Creatinine 1.14 0.66 - 1.25 mg/dL    Calcium 8.8 8.5 - 10.1 mg/dL    Glucose 124 (H) 70 - 99 mg/dL    Alkaline Phosphatase 82 40 - 150 U/L    AST 28 0 - 45 U/L    ALT 18 0 - 70 U/L    Protein Total 7.1 6.8 - 8.8 g/dL    Albumin 3.8 3.4 - 5.0 g/dL    Bilirubin Total 1.8 (H) 0.2 - 1.3 mg/dL    GFR Estimate 65 >60 mL/min/1.73m2   CBC with platelets and differential   Result Value Ref Range    WBC Count 13.7 (H) 4.0 - 11.0 10e3/uL    RBC Count 4.89 4.40 - 5.90 10e6/uL    Hemoglobin 15.7 13.3 - 17.7 g/dL    Hematocrit 44.8 40.0 - 53.0 %    MCV 92 78 - 100 fL    MCH 32.1 26.5 - 33.0 pg    MCHC 35.0 31.5 - 36.5 g/dL    RDW 12.3 10.0 - 15.0 %    Platelet Count 329 150 - 450 10e3/uL    % Neutrophils 77 %    % Lymphocytes 14 %    % Monocytes 8 %    % Eosinophils 1 %    % Basophils 0 %    % Immature Granulocytes 0 %    NRBCs per 100 WBC 0 <1 /100    Absolute Neutrophils 10.4 (H) 1.6 - 8.3 10e3/uL    Absolute Lymphocytes 2.0 0.8 - 5.3 10e3/uL    Absolute Monocytes 1.2 0.0 - 1.3 10e3/uL    Absolute Eosinophils 0.1 0.0 - 0.7 10e3/uL    Absolute Basophils 0.1 0.0 - 0.2 10e3/uL    Absolute Immature Granulocytes 0.0 <=0.4 10e3/uL    Absolute NRBCs 0.0 10e3/uL         Procedures              Critical Care time:  none                   Medications   0.9% sodium chloride BOLUS (1,000 mLs Intravenous New Bag 6/9/22 8834)     Followed by   sodium chloride 0.9% infusion (has no administration in time range)       Assessments & Plan (with Medical Decision Making)  81-year-old in earlier today and had a Sahu  catheter placed for urinary retention.  Now developed gross hematuria tonight.  No pain.  Catheter still draining.  Started his colonoscopy prep this evening but no results yet.  He had some results here in the ED from his prep and then became quite pale and diaphoretic.  Nursing staff helped him back to bed.  IV was placed, labs sent and a liter of normal saline was ordered.  Is labs are reassuring.  White count up slightly within normal differential.  Lactic acid normal.  UA shows gross hematuria but no convincing evidence for infection.  Total bilirubin up slightly at 1.8 but the rest of his LFTs are normal.  This can be rechecked in clinic.  He has had a couple of bowel movements now and is feeling better.  Blood pressure normalized quickly.  Good color in his face.  He is hopeful that he can continue with his bowel prep in anticipation of his colonoscopy and upper endoscopy in the morning.  If his gross hematuria persists, he will need to see urology.  We discussed reportable signs and when to return.  Verbal and written discharge instructions given.  He and his wife are comfortable with this plan and anxious to get going home     I have reviewed the nursing notes.    I have reviewed the findings, diagnosis, plan and need for follow up with the patient.       New Prescriptions    No medications on file       Final diagnoses:   Gross hematuria   Vasovagal near syncope - due to bowel cramps/diarrhea from colonscopy prep   Urinary retention - singletary placed earlier today       6/9/2022   [facility] EMERGENCY DEPT     Stan Rudolph MD  06/09/22 5096

## 2022-06-10 NOTE — ANESTHESIA CARE TRANSFER NOTE
Patient: Billy Santana    Procedure: Procedure(s):  COLONOSCOPY, WITH POLYPECTOMY  ESOPHAGOGASTRODUODENOSCOPY, WITH BIOPSY       Diagnosis: Hiatal hernia [K44.9]  Weight loss [R63.4]  Diagnosis Additional Information: No value filed.    Anesthesia Type:   MAC     Note:    Oropharynx: oropharynx clear of all foreign objects and spontaneously breathing  Level of Consciousness: drowsy  Oxygen Supplementation: face mask    Independent Airway: airway patency satisfactory and stable  Dentition: dentition unchanged  Vital Signs Stable: post-procedure vital signs reviewed and stable  Report to RN Given: handoff report given  Patient transferred to: Phase II    Handoff Report: Identifed the Patient, Identified the Reponsible Provider, Reviewed the pertinent medical history, Discussed the surgical course, Reviewed Intra-OP anesthesia mangement and issues during anesthesia, Set expectations for post-procedure period and Allowed opportunity for questions and acknowledgement of understanding      Vitals:  Vitals Value Taken Time   BP     Temp     Pulse     Resp     SpO2         Electronically Signed By: ISABELLA Guzman CRNA  Alina 10, 2022  10:40 AM

## 2022-06-10 NOTE — ANESTHESIA PREPROCEDURE EVALUATION
Anesthesia Pre-Procedure Evaluation    Patient: Billy Santana   MRN: [id number] : 1940        Procedure : Procedure(s):  COLONOSCOPY  ESOPHAGOGASTRODUODENOSCOPY (EGD)          Past Medical History:   Diagnosis Date     Hiatal hernia       History reviewed. No pertinent surgical history.   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, Previous cardiac testing   Echo: Date: Results:    Stress Test: Date: Results:    ECG Reviewed: Date: 6/10/22 Results:  SR with bbb  Cath: Date: Results:      METS/Exercise Tolerance:     Hematologic:  - neg hematologic  ROS     Musculoskeletal:  - neg musculoskeletal ROS     GI/Hepatic: Comment: Bleeding ulcers    (+) GERD, Asymptomatic on medication, hiatal hernia, bowel prep,     Renal/Genitourinary: Comment: Hematuria       Endo:  - neg endo ROS     Psychiatric/Substance Use:  - neg psychiatric ROS     Infectious Disease:  - neg infectious disease ROS     Malignancy:  - neg malignancy ROS     Other:            Physical Exam    Airway        Mallampati: II   TM distance: > 3 FB   Neck ROM: full   Mouth opening: > 3 cm    Respiratory Devices and Support         Dental           Cardiovascular   cardiovascular exam normal          Pulmonary   pulmonary exam normal                OUTSIDE LABS:  CBC:   Lab Results   Component Value Date    WBC 13.7 (H) 2022    HGB 15.7 2022    HCT 44.8 2022     2022     BMP:   Lab Results   Component Value Date     2022    POTASSIUM 4.1 2022     CHLORIDE 106 06/09/2022    CO2 23 06/09/2022    BUN 14 06/09/2022    CR 1.14 06/09/2022     (H) 06/09/2022     COAGS: No results found for: PTT, INR, FIBR  POC: No results found for: BGM, HCG, HCGS  HEPATIC:   Lab Results   Component Value Date    ALBUMIN 3.8 06/09/2022    PROTTOTAL 7.1 06/09/2022    ALT 18 06/09/2022    AST 28 06/09/2022    ALKPHOS 82 06/09/2022    BILITOTAL 1.8 (H) 06/09/2022     OTHER:   Lab Results   Component Value Date    LACT 1.7 06/09/2022    CHIQUIS 8.8 06/09/2022       Anesthesia Plan    ASA Status:  2   NPO Status:  NPO Appropriate    Anesthesia Type: MAC.     - Reason for MAC: chronic cardiopulmonary disease, straight local not clinically adequate   Induction: Intravenous, Propofol.   Maintenance: TIVA.        Consents    Anesthesia Plan(s) and associated risks, benefits, and realistic alternatives discussed. Questions answered and patient/representative(s) expressed understanding.     - Discussed: Risks, Benefits and Alternatives for BOTH SEDATION and the PROCEDURE were discussed     - Discussed with:  Patient, Spouse    Use of blood products discussed: No .     Postoperative Care            Comments:    Other Comments: The risks and benefits of anesthesia, and the alternatives where applicable, have been discussed with the patient, and they wish to proceed.            ISABELLA Guzman CRNA

## 2022-06-10 NOTE — ED NOTES
Pt is feeling better, taking sips of water. VS wnl. IV fluids infusing. Pt is lying in bed resting, sx improving. Call light in reach. Wife in room with pt at bedside.

## 2022-06-10 NOTE — DISCHARGE INSTRUCTIONS
[facility]    Home Care Following Endoscopy    Dr. FREDO Peters      Activity:  You have just undergone an endoscopic procedure usually performed with conscious sedation. Today you received Monitored Anesthesia Care (MAC).  Do not work or operate machinery (including a car) or drink alcohol (including beer) or sign legal papers for at least 12 hours.    I encourage you to walk and attempt to pass this air as soon as possible.    Diet:  Return to the diet you were on before your procedure but eat lightly for the first 12-24 hours.  Drink plenty of water.  Resume any regular medications unless otherwise advised by your physician.     You had any biopsies and a  polyp removed so please refrain from aspirin or aspirin products for 2 days.     Pain:  You may take Tylenol as needed for pain.  Expected Recovery:  You can expect some mild abdominal fullness and/or discomfort due to the air used to inflate your intestinal tract. It is also normal to have a mild sore throat after upper endoscopy.    Call Your Physician if You Have:  After Upper Endoscopy:  Shoulder, back or chest pain.  Difficulty breathing or swallowing.  Vomiting blood.  After Colonoscopy:  Worsening persisting abdominal pain which is worse with activity.  Fevers (>101 degrees F), chills or shakes.  Passage of continued blood with bowel movements.     Any questions or concerns about your recovery, please call [phone] or after hours [phone] [facility] Nurse Advice Line.    Follow-up Care:  You did have polyps/biopsy tissue sample(s) removed.  The polyps/biopsy tissue sample(s) will be sent to pathology.  You should receive letter in your My Chart from Dr. FREDO Peters with your results within 1-2 weeks. If you do not participate in My Chart a physical letter will come in the mail in 2-3 weeks.  Please call if you have not received a notification of your results.

## 2022-06-10 NOTE — TELEPHONE ENCOUNTER
Reason for Call:  Other call back    Detailed comments: pt had catheter placed in ED 6/8. Was told to call urology to have removed. Can pt be worked in at [place] location?    Phone Number Patient can be reached at: Home number on file [phone] (home)    Best Time: any    Can we leave a detailed message on this number? YES    Call taken on 6/10/2022 at 4:11 PM by Daysi Upton

## 2022-06-10 NOTE — ED TRIAGE NOTES
Pt had singletary cath inserted today and discharged home. In ED pt had no blood noted in urine - pt is now having blood in urine. Cath has been draining well into leg bag.    Pt at home started bowel prep for colonoscopy in AM - has taken half of the required prep and no results yet - 'passing gas only'.     Pt states he had felt mildly dizzy - but thinks he is 'kind of nervous', some anxiety.      Triage Assessment     Row Name 06/09/22 1277       Triage Assessment (Adult)    Airway WDL WDL       Respiratory WDL    Respiratory WDL WDL       Cardiac WDL    Cardiac WDL WDL

## 2022-06-10 NOTE — ANESTHESIA POSTPROCEDURE EVALUATION
Patient: Billy Santana    Procedure: Procedure(s):  COLONOSCOPY, WITH POLYPECTOMY  ESOPHAGOGASTRODUODENOSCOPY, WITH BIOPSY       Anesthesia Type:  MAC    Note:  Disposition: Outpatient   Postop Pain Control: Uneventful            Sign Out: Well controlled pain   PONV: No   Neuro/Psych: Uneventful            Sign Out: Acceptable/Baseline neuro status   Airway/Respiratory: Uneventful            Sign Out: Acceptable/Baseline resp. status   CV/Hemodynamics: Uneventful            Sign Out: Acceptable CV status   Other NRE: NONE   DID A NON-ROUTINE EVENT OCCUR? No    Event details/Postop Comments:  Pt was happy with anesthesia care.  No complications.  I will follow up with the pt if needed.           Last vitals:  Vitals Value Taken Time   BP 93/49 06/10/22 1050   Temp     Pulse 67 06/10/22 1050   Resp 16 06/10/22 1050   SpO2 96 % 06/10/22 1055   Vitals shown include unvalidated device data.    Electronically Signed By: ISABELLA Guzman CRNA  Alina 10, 2022  10:56 AM

## 2022-06-10 NOTE — DISCHARGE INSTRUCTIONS
Continue with your bowel prep in anticipation of your colonoscopy in the morning.  If you feel lightheaded headed, sit or lay down so you do not fall and hurt yourself.  I suspect that the blood in your urine is from the catheter irritating the inside of the bladder.  There is no sign of infection right now.  Most of the time this will clear on its own.  If it does not, you will need to see urology for further evaluation.  Return to the ED if worse/concerns.  It was nice visiting with both of you this evening.  I hope the rest of your colonoscopy prep goes smoothly and that your results tomorrow are reassuring.    Thank you for choosing [facility]. We appreciate the opportunity to meet your urgent medical needs. Please let us know if we could have done anything to make your stay more satisfying.    After discharge, please closely monitor for any new or worsening symptoms. Return to the Emergency Department if you develop any acute worsening signs or symptoms.    If you had lab work, cultures or imaging studies done during your stay, the final results may still be pending. We will call you if your plan of care needs to change. However, if you are not improving as expected, please follow up with your primary care provider or clinic.     Start any prescription medications that were prescribed to you and take them as directed.     Please see additional handouts that may be pertinent to your condition.

## 2022-06-14 ENCOUNTER — HOSPITAL ENCOUNTER (EMERGENCY)
Facility: CLINIC | Age: 82
Discharge: HOME | End: 2022-06-14
Attending: EMERGENCY MEDICINE | Admitting: EMERGENCY MEDICINE
Payer: COMMERCIAL

## 2022-06-14 ENCOUNTER — APPOINTMENT (OUTPATIENT)
Dept: CT IMAGING | Facility: CLINIC | Age: 82
End: 2022-06-14
Attending: EMERGENCY MEDICINE
Payer: COMMERCIAL

## 2022-06-14 VITALS
OXYGEN SATURATION: 96 % | HEART RATE: 74 BPM | TEMPERATURE: 97.4 F | SYSTOLIC BLOOD PRESSURE: 139 MMHG | RESPIRATION RATE: 31 BRPM | DIASTOLIC BLOOD PRESSURE: 76 MMHG | WEIGHT: 189 LBS | BODY MASS INDEX: 25.63 KG/M2

## 2022-06-14 DIAGNOSIS — K29.70 HELICOBACTER PYLORI GASTRITIS: ICD-10-CM

## 2022-06-14 DIAGNOSIS — K44.9 HIATAL HERNIA: ICD-10-CM

## 2022-06-14 DIAGNOSIS — B96.81 HELICOBACTER PYLORI GASTRITIS: ICD-10-CM

## 2022-06-14 DIAGNOSIS — R33.9 URINARY RETENTION: ICD-10-CM

## 2022-06-14 DIAGNOSIS — I26.94 MULTIPLE SUBSEGMENTAL PULMONARY EMBOLI WITHOUT ACUTE COR PULMONALE (H): ICD-10-CM

## 2022-06-14 DIAGNOSIS — B37.0 THRUSH: ICD-10-CM

## 2022-06-14 LAB
ALBUMIN SERPL-MCNC: 3.3 G/DL (ref 3.4–5)
ALP SERPL-CCNC: 89 U/L (ref 40–150)
ALT SERPL W P-5'-P-CCNC: 21 U/L (ref 0–70)
ANION GAP SERPL CALCULATED.3IONS-SCNC: 6 MMOL/L (ref 3–14)
AST SERPL W P-5'-P-CCNC: 21 U/L (ref 0–45)
BASOPHILS # BLD AUTO: 0 10E3/UL (ref 0–0.2)
BASOPHILS NFR BLD AUTO: 0 %
BILIRUB SERPL-MCNC: 1.1 MG/DL (ref 0.2–1.3)
BUN SERPL-MCNC: 11 MG/DL (ref 7–30)
CALCIUM SERPL-MCNC: 9.2 MG/DL (ref 8.5–10.1)
CHLORIDE BLD-SCNC: 105 MMOL/L (ref 94–109)
CO2 SERPL-SCNC: 27 MMOL/L (ref 20–32)
CREAT SERPL-MCNC: 0.91 MG/DL (ref 0.66–1.25)
D DIMER PPP FEU-MCNC: 17.61 UG/ML FEU (ref 0–0.5)
EOSINOPHIL # BLD AUTO: 0.2 10E3/UL (ref 0–0.7)
EOSINOPHIL NFR BLD AUTO: 2 %
ERYTHROCYTE [DISTWIDTH] IN BLOOD BY AUTOMATED COUNT: 12.5 % (ref 10–15)
FLUAV RNA SPEC QL NAA+PROBE: NEGATIVE
FLUBV RNA RESP QL NAA+PROBE: NEGATIVE
GFR SERPL CREATININE-BSD FRML MDRD: 86 ML/MIN/1.73M2
GLUCOSE BLD-MCNC: 109 MG/DL (ref 70–99)
HCT VFR BLD AUTO: 41.2 % (ref 40–53)
HGB BLD-MCNC: 14.7 G/DL (ref 13.3–17.7)
HOLD SPECIMEN: NORMAL
HOLD SPECIMEN: NORMAL
IMM GRANULOCYTES # BLD: 0 10E3/UL
IMM GRANULOCYTES NFR BLD: 1 %
LIPASE SERPL-CCNC: 322 U/L (ref 73–393)
LYMPHOCYTES # BLD AUTO: 1 10E3/UL (ref 0.8–5.3)
LYMPHOCYTES NFR BLD AUTO: 12 %
MAGNESIUM SERPL-MCNC: 2.1 MG/DL (ref 1.6–2.3)
MCH RBC QN AUTO: 32.2 PG (ref 26.5–33)
MCHC RBC AUTO-ENTMCNC: 35.7 G/DL (ref 31.5–36.5)
MCV RBC AUTO: 90 FL (ref 78–100)
MONOCYTES # BLD AUTO: 0.7 10E3/UL (ref 0–1.3)
MONOCYTES NFR BLD AUTO: 8 %
NEUTROPHILS # BLD AUTO: 6.8 10E3/UL (ref 1.6–8.3)
NEUTROPHILS NFR BLD AUTO: 77 %
NRBC # BLD AUTO: 0 10E3/UL
NRBC BLD AUTO-RTO: 0 /100
PLATELET # BLD AUTO: 217 10E3/UL (ref 150–450)
POTASSIUM BLD-SCNC: 3.8 MMOL/L (ref 3.4–5.3)
PROT SERPL-MCNC: 6.8 G/DL (ref 6.8–8.8)
RBC # BLD AUTO: 4.56 10E6/UL (ref 4.4–5.9)
SARS-COV-2 RNA RESP QL NAA+PROBE: NEGATIVE
SODIUM SERPL-SCNC: 138 MMOL/L (ref 133–144)
TROPONIN I SERPL HS-MCNC: 23 NG/L
WBC # BLD AUTO: 8.7 10E3/UL (ref 4–11)

## 2022-06-14 PROCEDURE — 99285 EMERGENCY DEPT VISIT HI MDM: CPT | Mod: CS | Performed by: EMERGENCY MEDICINE

## 2022-06-14 PROCEDURE — 71275 CT ANGIOGRAPHY CHEST: CPT

## 2022-06-14 PROCEDURE — 85025 COMPLETE CBC W/AUTO DIFF WBC: CPT | Performed by: EMERGENCY MEDICINE

## 2022-06-14 PROCEDURE — 250N000009 HC RX 250: Performed by: EMERGENCY MEDICINE

## 2022-06-14 PROCEDURE — 96360 HYDRATION IV INFUSION INIT: CPT | Mod: 59 | Performed by: EMERGENCY MEDICINE

## 2022-06-14 PROCEDURE — 250N000011 HC RX IP 250 OP 636: Performed by: EMERGENCY MEDICINE

## 2022-06-14 PROCEDURE — 36415 COLL VENOUS BLD VENIPUNCTURE: CPT | Performed by: EMERGENCY MEDICINE

## 2022-06-14 PROCEDURE — 83690 ASSAY OF LIPASE: CPT | Performed by: EMERGENCY MEDICINE

## 2022-06-14 PROCEDURE — 83735 ASSAY OF MAGNESIUM: CPT | Performed by: EMERGENCY MEDICINE

## 2022-06-14 PROCEDURE — 87636 SARSCOV2 & INF A&B AMP PRB: CPT | Performed by: EMERGENCY MEDICINE

## 2022-06-14 PROCEDURE — 99285 EMERGENCY DEPT VISIT HI MDM: CPT | Mod: CS,25 | Performed by: EMERGENCY MEDICINE

## 2022-06-14 PROCEDURE — 93010 ELECTROCARDIOGRAM REPORT: CPT | Performed by: EMERGENCY MEDICINE

## 2022-06-14 PROCEDURE — 258N000003 HC RX IP 258 OP 636: Performed by: EMERGENCY MEDICINE

## 2022-06-14 PROCEDURE — 84484 ASSAY OF TROPONIN QUANT: CPT | Performed by: EMERGENCY MEDICINE

## 2022-06-14 PROCEDURE — 93005 ELECTROCARDIOGRAM TRACING: CPT | Performed by: EMERGENCY MEDICINE

## 2022-06-14 PROCEDURE — 85379 FIBRIN DEGRADATION QUANT: CPT | Performed by: EMERGENCY MEDICINE

## 2022-06-14 PROCEDURE — C9803 HOPD COVID-19 SPEC COLLECT: HCPCS | Performed by: EMERGENCY MEDICINE

## 2022-06-14 PROCEDURE — 80053 COMPREHEN METABOLIC PANEL: CPT | Performed by: EMERGENCY MEDICINE

## 2022-06-14 PROCEDURE — 82040 ASSAY OF SERUM ALBUMIN: CPT | Performed by: EMERGENCY MEDICINE

## 2022-06-14 RX ORDER — SUCRALFATE 1 G/1
1 TABLET ORAL 4 TIMES DAILY
Qty: 120 TABLET | Refills: 0 | Status: SHIPPED | OUTPATIENT
Start: 2022-06-14 | End: 2022-07-07

## 2022-06-14 RX ORDER — IOPAMIDOL 755 MG/ML
500 INJECTION, SOLUTION INTRAVASCULAR ONCE
Status: COMPLETED | OUTPATIENT
Start: 2022-06-14 | End: 2022-06-14

## 2022-06-14 RX ORDER — APIXABAN 5 MG (74)
KIT ORAL
Qty: 74 EACH | Refills: 0 | Status: SHIPPED | OUTPATIENT
Start: 2022-06-14 | End: 2022-07-14

## 2022-06-14 RX ORDER — NYSTATIN 100000/ML
500000 SUSPENSION, ORAL (FINAL DOSE FORM) ORAL 4 TIMES DAILY
Qty: 240 ML | Refills: 0 | Status: SHIPPED | OUTPATIENT
Start: 2022-06-14 | End: 2022-06-22

## 2022-06-14 RX ORDER — MAGNESIUM HYDROXIDE/ALUMINUM HYDROXICE/SIMETHICONE 120; 1200; 1200 MG/30ML; MG/30ML; MG/30ML
15 SUSPENSION ORAL EVERY 4 HOURS PRN
COMMUNITY

## 2022-06-14 RX ADMIN — SODIUM CHLORIDE 500 ML: 9 INJECTION, SOLUTION INTRAVENOUS at 10:04

## 2022-06-14 RX ADMIN — SODIUM CHLORIDE 70 ML: 9 INJECTION, SOLUTION INTRAVENOUS at 09:29

## 2022-06-14 RX ADMIN — IOPAMIDOL 70 ML: 755 INJECTION, SOLUTION INTRAVENOUS at 09:30

## 2022-06-14 NOTE — ED TRIAGE NOTES
Patient c/o shortness of breath today and he had Sats at home in the 80's with activity.      Triage Assessment     Row Name 06/14/22 0741       Triage Assessment (Adult)    Airway WDL WDL       Cardiac WDL    Cardiac WDL X;chest pain       Chest Pain Assessment    Chest Pain Location epigastric;midsternal    Character pressure    Alleviating Factors eating       Peripheral/Neurovascular WDL    Peripheral Neurovascular WDL WDL       Cognitive/Neuro/Behavioral WDL    Cognitive/Neuro/Behavioral WDL WDL

## 2022-06-14 NOTE — DISCHARGE INSTRUCTIONS
I changed your follow-up appointment with Dr. Leal to Friday.    Start the Eliquis today as prescribed.  This is a blood thinner for stabilization of the blood clots in the lungs.  Like any blood thinner, it can cause bleeding so if you have any bleeding concerns including black or bloody stools or uncontrolled nosebleeds please return at any time.    Continue your antibiotics for Helicobacter pylori.    I am prescribing Carafate to help protect your stomach.  This can be taken up to 4 times daily.    Nystatin swish and swallow or swish and spit 4 times daily for thrush.    I sent a referral for thoracic surgery follow-up.    Follow-up with urology as scheduled.    Return promptly at anytime for concerns.    I hope that all of your follow-up appointments go well and you start to feel much better quickly!!!

## 2022-06-15 NOTE — ED PROVIDER NOTES
History     Chief Complaint   Patient presents with     Shortness of Breath     HPI  History per patient, medical records, wife    This is a 81-year-old male, history of recent diagnosis of Helicobacter pylori, currently on oral antibiotic treatment, hiatal hernia, presenting with shortness of breath.  Patient has not had medical care for about 20 years but earlier this month he was seen at an outside clinic complaining of feeling unwell with upper stomach issues, difficulty belching.  He noted early satiety along with weight loss.  He had a breath test positive for Helicobacter pylori and was started on antibiotics and PPI.  X-ray showed large hiatal hernia.  Patient came to this ED on 6/7/2022 hoping to obtain referral for endoscopy/colonoscopy and set up care within [facility].  Procedure scheduled for 6/10/2022.  While he was doing his prep he noted urinary retention and came to the ED 6/9/2022 here and had a Sahu catheter placed.  He returned the same day with gross hematuria and had a near syncopal event in the ED but was able to be discharged home.  Colonoscopy showed diverticula and removal of 1 polyp, upper GI showed 15 cm hiatal hernia and gastritis.  This was biopsied.    Patient returns today because he noted feeling weak this morning along with some dyspnea on exertion.  His wife checked his heart rate and oxygenation and he was noted to have a heart rate of 108 with activity which returned to normal when seated.  His oxygen saturations went down to 88% on room air.  He denies any chest pain but does note intermittent abdominal/chest pain associated with inability to belch.  He denies headache, sore throat, vision changes, difficulty swallowing.  He felt mildly lightheaded.  He has not had any nausea.  He notes an occasional cough.  He has been on his antibiotics for about 5 days.  He is not on any diuretics.  He denies calf pain but has had some swelling of the left foot and notes a little  tightness in the left calf.  Prior to this he was quite active and his wife notes that he runs daily.  No fevers or chills.  Patient does not smoke.    Allergies:  Allergies   Allergen Reactions     No Known Drug Allergies        Problem List:    Patient Active Problem List    Diagnosis Date Noted     Health Care Home 12/28/2012     Priority: Cali Beauchamp RN-PHN  [facility]   [phone]    DX V65.8 REPLACED WITH 68375 [facility]ALTH CARE HOME (04/08/2013)          Past Medical History:    Past Medical History:   Diagnosis Date     Hiatal hernia        Past Surgical History:    Past Surgical History:   Procedure Laterality Date     COLONOSCOPY N/A 6/10/2022    Procedure: COLONOSCOPY, WITH POLYPECTOMY;  Surgeon: Andres Peters MD;  Location:  GI     ESOPHAGOSCOPY, GASTROSCOPY, DUODENOSCOPY (EGD), COMBINED N/A 6/10/2022    Procedure: ESOPHAGOGASTRODUODENOSCOPY, WITH BIOPSY;  Surgeon: Andres Peters MD;  Location:  GI       Family History:    No family history on file.    Social History:  Marital Status:   [2]  Social History     Tobacco Use     Smoking status: Never Smoker     Smokeless tobacco: Never Used   Substance Use Topics     Alcohol use: Yes     Comment: rare     Drug use: Never        Medications:    No current facility-administered medications on file prior to encounter.  alum & mag hydroxide-simethicone (MAALOX) 200-200-20 MG/5ML SUSP suspension, Take 15 mLs by mouth every 4 hours as needed for indigestion  amoxicillin (AMOXIL) 500 MG capsule, Take 1,000 mg by mouth 2 times daily  clarithromycin (BIAXIN) 500 MG tablet, Take 500 mg by mouth 2 times daily  metoclopramide (REGLAN) 5 MG tablet, Take 1 tablet (5 mg) by mouth 4 times daily as needed (nausea or bloating) (Patient taking differently: Take 5 mg by mouth At Bedtime)  omeprazole (PRILOSEC) 20 MG DR capsule, Take 20 mg by mouth 2 times daily         Review of Systems   All other ROS reviewed and are  negative or non-contributory except as stated in HPI.     Physical Exam   BP: (!) 163/94  Pulse: 80  Temp: 97.4  F (36.3  C)  Resp: (!) 36  Weight: 85.7 kg (189 lb)  SpO2: 93 %  Lying Orthostatic BP: 145/84  Lying Orthostatic Pulse: 73 bpm  Sitting Orthostatic BP: 160/95  Sitting Orthostatic Pulse: 82 bpm  Standing Orthostatic BP: 164/94  Standing Orthostatic Pulse: 92 bpm      Physical Exam  Vitals and nursing note reviewed.   Constitutional:       Appearance: Normal appearance. He is normal weight.   HENT:      Head: Normocephalic.      Nose: Nose normal.      Mouth/Throat:      Comments: Tacky mucous membranes, white plaque on the tongue  Eyes:      General: No scleral icterus.     Extraocular Movements: Extraocular movements intact.      Conjunctiva/sclera: Conjunctivae normal.   Cardiovascular:      Rate and Rhythm: Normal rate and regular rhythm.      Pulses: Normal pulses.      Heart sounds: Normal heart sounds.   Pulmonary:      Effort: Pulmonary effort is normal.      Breath sounds: Normal breath sounds.   Abdominal:      Palpations: Abdomen is soft.      Tenderness: There is no abdominal tenderness.   Genitourinary:     Comments: Sahu catheter in place  Musculoskeletal:         General: Normal range of motion.      Cervical back: Normal range of motion and neck supple.      Comments: Mild pedal edema, left greater than right   Skin:     General: Skin is warm and dry.      Coloration: Skin is not pale.      Findings: No bruising or rash.   Neurological:      General: No focal deficit present.      Mental Status: He is alert and oriented to person, place, and time.   Psychiatric:         Mood and Affect: Mood normal.         Behavior: Behavior normal.         ED Course (with Medical Decision Making)    Pt seen and examined by me.  RN and EPIC notes reviewed.       Patient with concerns of weakness, shortness of breath, dyspnea on exertion, decreased action saturations and tachycardia at home.  He is  "currently being treated for Helicobacter pylori and was recently diagnosed with a large hiatal hernia.    EKG was done.  Labs drawn.  Patient has good O2 sats on room air.    EKG shows sinus rhythm with an occasional ectopic ventricular beat.  He also has an incomplete right bundle branch block and left axis-anterior fascicular block.  This is similar to EKG that was done on 6/10/2022.  This was read by myself today at 8:02 AM.    I checked COVID.  This was negative.  Comprehensive metabolic panel unremarkable.  Troponin normal.  Lipase normal.  CBC normal.  Unfortunately, patient's D-dimer extremely elevated at 17.61.  CT PE ordered.    CT shows extensive bilateral pulmonary artery emboli involving all lobes of bilateral lungs.  No evidence for right heart strain.  He has pulmonary nodules bilaterally.  Large hiatal hernia is noted.    I discussed the results with the patient and his wife.  I had him walk about the department and his oxygen saturation stayed at 94% on room air.  I did discuss the patient with the hospitalist but he thought patient at this point could be treated outpatient.    I am concerned because patient has gastritis but obviously anticoagulation would be most important.  He has remote history of \"bleeding ulcer\".  He is being treated for the Helicobacter pylori and is on a PPI.    I am going to start him on Eliquis.  I am also going to prescribe some Carafate.  He has thrush so I am also going to start nystatin.  I was able to move his primary care provider appointment to Friday, 3 days from now.  He has a urology appointment.  I am guessing he will need to be worked up for possible underlying malignancy or other cause for his PEs, I think prostate would be high on the list.  He is low risk for lung issues but he does have lung nodules, follow-up CT recommended at 6 months.    Patient is to follow-up on Friday with primary care, follow-up next week with urology, and if he has any significant " worsening, changes from promptly at any time.  Thoracic surgery referral also sent for hiatal hernia.  Patient and his wife comfortable with this plan.      Procedures    Results for orders placed or performed during the hospital encounter of 06/14/22 (from the past 24 hour(s))   Symptomatic; Unknown Influenza A/B & SARS-CoV2 (COVID-19) Virus PCR Multiplex Nasopharyngeal    Specimen: Nasopharyngeal; Swab   Result Value Ref Range    Influenza A PCR Negative Negative    Influenza B PCR Negative Negative    SARS CoV2 PCR Negative Negative    Narrative    Testing was performed using the clair SARS-CoV-2 & Influenza A/B Assay on the clair [employer] System. This test should be ordered for the detection of SARS-CoV-2 and influenza viruses in individuals who meet clinical and/or epidemiological criteria. Test performance is unknown in asymptomatic patients. This test is for in vitro diagnostic use under the FDA EUA for laboratories certified under CLIA to perform moderate and/or high complexity testing. This test has not been FDA cleared or approved. A negative result does not rule out the presence of PCR inhibitors in the specimen or target RNA in concentration below the limit of detection for the assay. If only one viral target is positive but coinfection with multiple targets is suspected, the sample should be re-tested with another FDA cleared, approved or authorized test, if coinfection would change clinical management. [facility] are certified under the Clinical Laboratory Improvement Amendments of 1988 (CLIA-88) as  qualified to perform moderate and/or high complexity laboratory testing.   CBC with platelets differential    Narrative    The following orders were created for panel order CBC with platelets differential.  Procedure                               Abnormality         Status                     ---------                               -----------         ------                     CBC with  platelets and d...[[id number]]                      Final result                 Please view results for these tests on the individual orders.   Comprehensive metabolic panel   Result Value Ref Range    Sodium 138 133 - 144 mmol/L    Potassium 3.8 3.4 - 5.3 mmol/L    Chloride 105 94 - 109 mmol/L    Carbon Dioxide (CO2) 27 20 - 32 mmol/L    Anion Gap 6 3 - 14 mmol/L    Urea Nitrogen 11 7 - 30 mg/dL    Creatinine 0.91 0.66 - 1.25 mg/dL    Calcium 9.2 8.5 - 10.1 mg/dL    Glucose 109 (H) 70 - 99 mg/dL    Alkaline Phosphatase 89 40 - 150 U/L    AST 21 0 - 45 U/L    ALT 21 0 - 70 U/L    Protein Total 6.8 6.8 - 8.8 g/dL    Albumin 3.3 (L) 3.4 - 5.0 g/dL    Bilirubin Total 1.1 0.2 - 1.3 mg/dL    GFR Estimate 86 >60 mL/min/1.73m2   Troponin I   Result Value Ref Range    Troponin I High Sensitivity 23 <79 ng/L   Lipase   Result Value Ref Range    Lipase 322 73 - 393 U/L   CBC with platelets and differential   Result Value Ref Range    WBC Count 8.7 4.0 - 11.0 10e3/uL    RBC Count 4.56 4.40 - 5.90 10e6/uL    Hemoglobin 14.7 13.3 - 17.7 g/dL    Hematocrit 41.2 40.0 - 53.0 %    MCV 90 78 - 100 fL    MCH 32.2 26.5 - 33.0 pg    MCHC 35.7 31.5 - 36.5 g/dL    RDW 12.5 10.0 - 15.0 %    Platelet Count 217 150 - 450 10e3/uL    % Neutrophils 77 %    % Lymphocytes 12 %    % Monocytes 8 %    % Eosinophils 2 %    % Basophils 0 %    % Immature Granulocytes 1 %    NRBCs per 100 WBC 0 <1 /100    Absolute Neutrophils 6.8 1.6 - 8.3 10e3/uL    Absolute Lymphocytes 1.0 0.8 - 5.3 10e3/uL    Absolute Monocytes 0.7 0.0 - 1.3 10e3/uL    Absolute Eosinophils 0.2 0.0 - 0.7 10e3/uL    Absolute Basophils 0.0 0.0 - 0.2 10e3/uL    Absolute Immature Granulocytes 0.0 <=0.4 10e3/uL    Absolute NRBCs 0.0 10e3/uL   Magnesium   Result Value Ref Range    Magnesium 2.1 1.6 - 2.3 mg/dL   [place] Draw    Narrative    The following orders were created for panel order [place] Draw.  Procedure                               Abnormality         Status                      ---------                               -----------         ------                     Extra Blue Top Tube[[id number]]                              Final result               Extra Green Top (Lithium...[[id number]]                      Final result                 Please view results for these tests on the individual orders.   Extra Blue Top Tube   Result Value Ref Range    Hold Specimen JIC    Extra Green Top (Lithium Heparin) ON ICE   Result Value Ref Range    Hold Specimen hold    D dimer quantitative   Result Value Ref Range    D-Dimer Quantitative 17.61 (H) 0.00 - 0.50 ug/mL FEU    Narrative    This D-dimer assay is intended for use in conjunction with a clinical pretest probability assessment model to exclude pulmonary embolism (PE) and deep venous thrombosis (DVT) in outpatients suspected of PE or DVT. The cut-off value is 0.50 ug/mL FEU.   CT Chest Pulmonary Embolism w Contrast    Narrative    CT CHEST PULMONARY EMBOLISM WITH CONTRAST June 14, 2022 10:07 AM    CLINICAL HISTORY: Shortness of breath. Elevated D-dimer.    TECHNIQUE: CT angiogram chest during arterial phase injection IV  contrast. 2D and 3D MIP reconstructions were performed by the CT  technologist. Dose reduction techniques were used.   CONTRAST: 70 mL-Isovue 370.    COMPARISON: None.    FINDINGS:  ANGIOGRAM CHEST: There are multiple filling defects scattered  throughout the pulmonary of all three lobes of the right lung and of  the lower lobe of the left lung most consistent with bilateral  pulmonary artery emboli. No definite right heart strain is seen.  Thoracic aorta is of normal caliber and demonstrates no dissection.  There are thoracic aortic calcifications.    LUNGS AND PLEURA: Nonobstructive nodule measuring 0.3 cm is seen in  the medial left upper lung lobe (image 54 series 6). This could  represent mucous plugging in the bronchus. There is a subtle  ground-glass nodular density in the left upper lung lobe  anterolaterally (image 141  series 6) measuring up to 0.7 cm. There is  an area of probable atelectasis in the posterior right lower lung lobe  extending to the posterior costophrenic angle. 0.4 cm diameter nodule  in the posterolateral right upper lung lobe (image 159 series 6) is  noted. No other significant pulmonary nodularity is seen. No definite  infiltrate, effusion, or pneumothorax.    MEDIASTINUM/AXILLAE: There is a very large hiatal hernia containing  much of the stomach. The heart is normal in size. Visualized portions  of the thyroid are unremarkable. No mediastinal, hilar, or axillary  lymphadenopathy. There are some thickening of the stomach in the  hiatal hernia.    UPPER ABDOMEN: Stomach is in the posterior chest. Hypoattenuating  lesion anterior cortex upper left kidney measuring up to 3.4 cm likely  represents a cyst and may be a hyperdense cyst given its density of 18  Hounsfield units. There is nonaneurysmal atherosclerosis. Visualized  portions of the upper abdominal contents are otherwise unremarkable.    MUSCULOSKELETAL: No aggressive osseous lesions or acute osseous  fractures.      Impression    IMPRESSION:   1. Extensive bilateral pulmonary artery emboli involving all lobes of  the bilateral lungs. No evidence for right heart strain is identified.  2. Pulmonary nodules bilaterally measure up to 0.7 cm.     Recommendations for an incidental lung nodule = or > 6mm to 8mm:    Low risk patients: Initial follow-up CT at 6-12 months, then  consider CT at 18-24 months if no change.    High risk patients: Initial follow-up CT at 6-12 months, then CT at  18-24 months if no change.    *Low Risk: Minimal or absent history of smoking or other known risk  factors.  *Nonsolid (ground-glass) or partly solid nodules may require longer  follow-up to exclude indolent adenocarcinoma.  *Recommendations based on Guidelines for the Management of Incidental  Pulmonary Nodules Detected at CT: From the Fleischner Society 2017,  Radiology  2017.    3. Probable atelectasis right posterior base.  4. Large hiatal hernia containing the stomach. There is some  thickening of the wall of the stomach which could be due to incomplete  distention. Infiltrative or inflammatory process is not entirely  excluded.    I called findings of the bilateral pulmonary artery emboli to Dr. Brooks on 6/14/2022 at approximately 10:28 AM.    XIMENA SOSA MD         SYSTEM ID:  [id number]       Medications   0.9% sodium chloride BOLUS (0 mLs Intravenous Stopped 6/14/22 1109)   sodium chloride 0.9 % bag 100mL for CT scan flush use (70 mLs Intravenous Given 6/14/22 0929)   iopamidol (ISOVUE-370) solution 500 mL (70 mLs Intravenous Given 6/14/22 0930)       Assessments & Plan   I have reviewed the findings, diagnosis, plan and need for follow up with the patient.    Discharge Medication List as of 6/14/2022 12:41 PM      START taking these medications    Details   Apixaban Starter Pack (ELIQUIS DVT/PE STARTER PACK) 5 MG TBPK Take 10 mg by mouth 2 times daily for 7 days, THEN 5 mg 2 times daily for 23 days., Disp-74 each, R-0, E-Prescribe      nystatin (MYCOSTATIN) 527266 UNIT/ML suspension Take 5 mLs (500,000 Units) by mouth 4 times daily for 10 days Swish and swallow/spit - for thrushDisp-240 mL, [id number]      sucralfate (CARAFATE) 1 GM tablet Take 1 tablet (1 g) by mouth 4 times daily For stomach protection, Disp-120 tablet, R-0, E-Prescribe             Final diagnoses:   Multiple subsegmental pulmonary emboli without acute cor pulmonale (H)   Hiatal hernia   Urinary retention - singletary in place   Helicobacter pylori gastritis - on medication therapy   Thrush     Disposition: Patient discharged home in stable condition.  Plan as above.  Return for concerns.     Note: Chart documentation done in part with Dragon Voice Recognition software. Although reviewed after completion, some word and grammatical errors may remain.     6/14/2022   [facility]  DEPT     Nerissa Brooks MD  06/14/22 2052       Nerissa Brooks MD  06/14/22 2053

## 2022-06-16 LAB
PATH REPORT.COMMENTS IMP SPEC: NORMAL
PATH REPORT.FINAL DX SPEC: NORMAL
PATH REPORT.GROSS SPEC: NORMAL
PATH REPORT.MICROSCOPIC SPEC OTHER STN: NORMAL
PATH REPORT.MICROSCOPIC SPEC OTHER STN: NORMAL
PATH REPORT.RELEVANT HX SPEC: NORMAL
PHOTO IMAGE: NORMAL

## 2022-06-17 ENCOUNTER — MYC MEDICAL ADVICE (OUTPATIENT)
Dept: FAMILY MEDICINE | Facility: CLINIC | Age: 82
End: 2022-06-17

## 2022-06-17 ENCOUNTER — OFFICE VISIT (OUTPATIENT)
Dept: FAMILY MEDICINE | Facility: CLINIC | Age: 82
End: 2022-06-17
Payer: COMMERCIAL

## 2022-06-17 VITALS
BODY MASS INDEX: 25.66 KG/M2 | OXYGEN SATURATION: 100 % | TEMPERATURE: 98.7 F | HEART RATE: 80 BPM | DIASTOLIC BLOOD PRESSURE: 74 MMHG | SYSTOLIC BLOOD PRESSURE: 138 MMHG | RESPIRATION RATE: 16 BRPM | WEIGHT: 189.2 LBS

## 2022-06-17 DIAGNOSIS — R33.9 URINARY RETENTION: ICD-10-CM

## 2022-06-17 DIAGNOSIS — I26.94 MULTIPLE SUBSEGMENTAL PULMONARY EMBOLI WITHOUT ACUTE COR PULMONALE (H): Primary | ICD-10-CM

## 2022-06-17 DIAGNOSIS — Z12.5 ENCOUNTER FOR SCREENING FOR MALIGNANT NEOPLASM OF PROSTATE: ICD-10-CM

## 2022-06-17 DIAGNOSIS — K21.9 GASTROESOPHAGEAL REFLUX DISEASE, UNSPECIFIED WHETHER ESOPHAGITIS PRESENT: ICD-10-CM

## 2022-06-17 DIAGNOSIS — B37.0 THRUSH: ICD-10-CM

## 2022-06-17 DIAGNOSIS — A04.8 H. PYLORI INFECTION: ICD-10-CM

## 2022-06-17 DIAGNOSIS — R91.8 PULMONARY NODULES: ICD-10-CM

## 2022-06-17 DIAGNOSIS — K44.9 HIATAL HERNIA: ICD-10-CM

## 2022-06-17 LAB
ANION GAP SERPL CALCULATED.3IONS-SCNC: 4 MMOL/L (ref 3–14)
BUN SERPL-MCNC: 13 MG/DL (ref 7–30)
CALCIUM SERPL-MCNC: 8.8 MG/DL (ref 8.5–10.1)
CHLORIDE BLD-SCNC: 107 MMOL/L (ref 94–109)
CO2 SERPL-SCNC: 27 MMOL/L (ref 20–32)
CREAT SERPL-MCNC: 0.94 MG/DL (ref 0.66–1.25)
ERYTHROCYTE [DISTWIDTH] IN BLOOD BY AUTOMATED COUNT: 12.4 % (ref 10–15)
GFR SERPL CREATININE-BSD FRML MDRD: 82 ML/MIN/1.73M2
GLUCOSE BLD-MCNC: 103 MG/DL (ref 70–99)
HCT VFR BLD AUTO: 42 % (ref 40–53)
HGB BLD-MCNC: 15 G/DL (ref 13.3–17.7)
MCH RBC QN AUTO: 32.3 PG (ref 26.5–33)
MCHC RBC AUTO-ENTMCNC: 35.7 G/DL (ref 31.5–36.5)
MCV RBC AUTO: 90 FL (ref 78–100)
PLATELET # BLD AUTO: 270 10E3/UL (ref 150–450)
POTASSIUM BLD-SCNC: 4.2 MMOL/L (ref 3.4–5.3)
PSA SERPL-MCNC: 8.19 UG/L (ref 0–4)
RBC # BLD AUTO: 4.65 10E6/UL (ref 4.4–5.9)
SODIUM SERPL-SCNC: 138 MMOL/L (ref 133–144)
WBC # BLD AUTO: 8.6 10E3/UL (ref 4–11)

## 2022-06-17 PROCEDURE — 85027 COMPLETE CBC AUTOMATED: CPT | Performed by: STUDENT IN AN ORGANIZED HEALTH CARE EDUCATION/TRAINING PROGRAM

## 2022-06-17 PROCEDURE — 99204 OFFICE O/P NEW MOD 45 MIN: CPT | Performed by: STUDENT IN AN ORGANIZED HEALTH CARE EDUCATION/TRAINING PROGRAM

## 2022-06-17 PROCEDURE — 36415 COLL VENOUS BLD VENIPUNCTURE: CPT | Performed by: STUDENT IN AN ORGANIZED HEALTH CARE EDUCATION/TRAINING PROGRAM

## 2022-06-17 PROCEDURE — 80048 BASIC METABOLIC PNL TOTAL CA: CPT | Performed by: STUDENT IN AN ORGANIZED HEALTH CARE EDUCATION/TRAINING PROGRAM

## 2022-06-17 PROCEDURE — G0103 PSA SCREENING: HCPCS | Performed by: STUDENT IN AN ORGANIZED HEALTH CARE EDUCATION/TRAINING PROGRAM

## 2022-06-17 ASSESSMENT — PAIN SCALES - GENERAL: PAINLEVEL: MILD PAIN (2)

## 2022-06-17 NOTE — PROGRESS NOTES
Assessment & Plan     Multiple subsegmental pulmonary emboli without acute cor pulmonale (H)  Patient currently asymptomatic and breathing back to baseline with Eliquis started.  No chest pain shortness of breath or cough.  We will need to continue this for at least 3 months.  No previous history of blood clots.  Concern for underlying malignancy and we will make sure he has cancer screening updated and PSA today which was elevated.  Colonoscopy recently done.  Does have bilateral nodules but otherwise low risk.  He will follow-up with thoracic surgery.  - CBC with platelets  - Basic metabolic panel  (Ca, Cl, CO2, Creat, Gluc, K, Na, BUN)  - PSA, screen  - CBC with platelets  - Basic metabolic panel  (Ca, Cl, CO2, Creat, Gluc, K, Na, BUN)  - PSA, screen    Pulmonary nodules  -Nodule in low risk patient.  We will have him follow-up with thoracic surgery given size of concern for underlying malignancy but I think prostate more likely at this point.      Hiatal hernia  Improved with Protonix and treating H. Pylori.  We will plan to follow-up with surgery for consideration of repair but I do wonder if he will continue to have improvement in symptoms with antacid treatment and H. pylori resolution. Plan to continue Carafate and omeprazole.  Can discontinue Reglan if not helpful.  Patient still does not seem to have quite the appetite as he had previously which may be related to hernia.  Does sound like things slightly improving but biggest concern is belching which has persisted.      Gastroesophageal reflux disease, unspecified whether esophagitis present  Continue omeprazole.    H. pylori infection  Patient pleated with course of antibiotics and negative for H. pylori on recent biopsy.      Urinary retention  Follow-up with urology scheduled.  Catheter in place until follow-up.    Encounter for screening for malignant neoplasm of prostate   PSA elevated to 8 now.  He does currently have a catheter in place.  We will  have him follow-up with urology.  - PSA, screen  - PSA, screen    Thrush  Resolved with nystatin         BMI:   Estimated body mass index is 25.66 kg/m  as calculated from the following:    Height as of 6/9/22: 1.829 m (6').    Weight as of this encounter: 85.8 kg (189 lb 3.2 oz).     50 minutes spent directly with patient and in chart review    Marvin Leal MD  [facility] KOMAL Choe is a 81 year old accompanied by his spouse., presenting for the following health issues:  ER F/U      HPI     Patient seen today for visit of multiple ER follow-ups.  Was diagnosed with H. pylori back on 2 June and started on antibiotic treatment with omeprazole.  Was seen for a few stomach discomfort belching loss of appetite and weight loss.  Seen in the ER on 6/7/2022 with some increasing pain.  Found to have a hiatal hernia at that time.  Returned on 6/9/2022 with urinary retention.  This was prior to bowel prep for colonoscopy.  Did have catheter placed and to move forward with colonoscopy as well as EGD.  He did return later for blood in the catheter after it was placed but this resolved.  Patient was then seen again on 6/14/2022 for shortness of breath.  This was new in onset and and found to have extensive bilateral pulmonary emboli both lobes.  Also saw pulmonary nodules and large hiatal hernia at that time.  No evidence of right heart strain and troponins were normal.  He was negative for COVID.  He did not require any oxygen and subsequently discharged home on Eliquis.  He was also given nystatin for thrush at that time.  He was also given Carafate given his recent hiatal hernia and history of bleeding ulcer in the past.  Completed course of antibiotics for H. pylori.  He now feels well today and feels like his breathing symptoms are back to normal but still having issues with appetite and belching.      Review of Systems   Constitutional, HEENT, cardiovascular, pulmonary, gi and gu  systems are negative, except as otherwise noted.      Objective    /74 (BP Location: Right arm, Patient Position: Chair, Cuff Size: Adult Large)   Pulse 80   Temp 98.7  F (37.1  C) (Temporal)   Resp 16   Wt 85.8 kg (189 lb 3.2 oz)   SpO2 100%   BMI 25.66 kg/m    Body mass index is 25.66 kg/m .  Physical Exam   GENERAL: healthy, alert and no distress  EYES: Eyes grossly normal to inspection, PERRL and conjunctivae and sclerae normal  HENT: ear canals and TM's normal, nose and mouth without ulcers or lesions  NECK: no adenopathy, no asymmetry, masses, or scars and thyroid normal to palpation  RESP: lungs clear to auscultation - no rales, rhonchi or wheezes  CV: regular rate and rhythm, normal S1 S2, no S3 or S4, no murmur, no peripheral edema and peripheral pulses strong  ABDOMEN: soft, nontender, no hepatosplenomegaly, no masses and bowel sounds normal  MS: no gross musculoskeletal defects noted, no edema  SKIN: no suspicious lesions or rashes  NEURO: Normal strength and tone, mentation intact and speech normal  PSYCH: mentation appears normal, affect normal/bright              .  ..

## 2022-06-19 ENCOUNTER — MYC MEDICAL ADVICE (OUTPATIENT)
Dept: FAMILY MEDICINE | Facility: CLINIC | Age: 82
End: 2022-06-19
Payer: COMMERCIAL

## 2022-06-19 DIAGNOSIS — R11.0 NAUSEA: Primary | ICD-10-CM

## 2022-06-20 ENCOUNTER — MYC MEDICAL ADVICE (OUTPATIENT)
Dept: FAMILY MEDICINE | Facility: CLINIC | Age: 82
End: 2022-06-20
Payer: COMMERCIAL

## 2022-06-20 ENCOUNTER — NURSE TRIAGE (OUTPATIENT)
Dept: FAMILY MEDICINE | Facility: CLINIC | Age: 82
End: 2022-06-20

## 2022-06-20 NOTE — TELEPHONE ENCOUNTER
Input: 52cc  142/78  Pulse: 78  O2: 86-90%    Nurse Triage SBAR    Is this a 2nd Level Triage? YES, LICENSED PRACTITIONER REVIEW IS REQUIRED    Situation: Heart fluttering yesterday, does not have this sensation today.  Has bilateral swelling in feet and ankles.  They believe both of these symptoms to be from Carafate.  Would like to cut dose in half.    Background:   Prostate Specific Antigen Screen   Date Value Ref Range Status   06/17/2022 8.19 (H) 0.00 - 4.00 ug/L Final     Patient has been in the hospital for hiatal hernia, weight loss, near syncope, urinary retention, and PE's.  He had an appointment with Dr. Leal on 6/21/22, but they canceled because he was seen on 6/17/22.      Assessment: As he is not having symptoms today of heart fluttering, probably OK to wait for Dr. Leal to return to office, but routing for covering provider's opinion.    Protocol Recommended Disposition:   See Today In Office    Recommendation: Should patient be seen today, or ok to wait for Dr. Leal's return tomorrow, 6/21/22?     Routing to covering pool    Does the patient meet one of the following criteria for ADS visit consideration? 16+ years old, with an [facility] PCP     TIP  Providers, please consider if this condition is appropriate for management at one of our Acute and Diagnostic Services sites.     If patient is a good candidate, please use dotphrase <dot>triageresponse and select Refer to ADS to document.    Reason for Disposition    Age > 60 years    Patient wants to be seen    Additional Information    Negative: Passed out (i.e., fainted, collapsed and was not responding)    Negative: Shock suspected (e.g., cold/pale/clammy skin, too weak to stand, low BP, rapid pulse)    Negative: Difficult to awaken or acting confused (e.g., disoriented, slurred speech)    Negative: Visible sweat on face or sweat dripping down face    Negative: Unable to walk, or can only walk with assistance (e.g., requires  support)    Negative: Received SHOCK from implantable cardiac defibrillator and has persisting symptoms (i.e., palpitations, lightheadedness)    Negative: Dizziness, lightheadedness, or weakness and heart beating very rapidly (e.g., > 140 / minute)    Negative: Dizziness, lightheadedness, or weakness and heart beating very slowly (e.g., < 50 / minute)    Negative: Sounds like a life-threatening emergency to the triager    Negative: Chest pain    Negative: Wearing a 'Holter monitor' or 'cardiac event monitor'    Negative: Received SHOCK from implantable cardiac defibrillator (and now feels well)    Negative: New or worsened shortness of breath with activity (dyspnea on exertion)    Negative: Patient sounds very sick or weak to the triager    Negative: Difficulty breathing    Negative: Dizziness, lightheadedness, or weakness    Negative: Heart beating very rapidly (e.g., > 140 / minute) and present now (Exception: during exercise)    Negative: Heart beating very slowly (e.g., < 50 / minute) (Exception: athlete)    Protocols used: HEART RATE AND HEARTBEAT [id number]

## 2022-06-20 NOTE — TELEPHONE ENCOUNTER
Provider Response to 2nd Level Triage Request    I have reviewed the RN documentation. My recommendation is:  Face To Face Visit. Next Day: to be seen by another provider in same service line and Dr. Leal tomorrow   Saw Kaufman MD

## 2022-06-20 NOTE — PROGRESS NOTES
RECORDS STATUS - ALL OTHER DIAGNOSIS      Action    Action Taken 6/20/2022 2:57AM RAIMUNDO     I called [facility] Ph: [phone]      RECORDS RECEIVED FROM: [facility]   DATE RECEIVED: 6/27/2022   NOTES STATUS DETAILS   OFFICE NOTE from referring provider     DISCHARGE SUMMARY from hospital Complete 6/10/2022- Hiatal Hernia    DISCHARGE REPORT from the ER Complete  6/14/2022 Multiple subsegmental pulmonary emboli without acute cor pulmonale (H)      OPERATIVE REPORT     MEDICATION LIST Complete [facility]   CLINICAL TRIAL TREATMENTS TO DATE     LABS     PATHOLOGY REPORTS     ANYTHING RELATED TO DIAGNOSIS Complete Labs last updated on 6/17/2022   GENONOMIC TESTING     TYPE:     IMAGING (NEED IMAGES & REPORT)     CT SCANS Complete CT Chest 6/14/2022   MRI     MAMMO     Xray abdomen  Complete- [facility]     ULTRASOUND     PET

## 2022-06-20 NOTE — TELEPHONE ENCOUNTER
Informed patient to go to ED tonight if he has the fluttering feeling tonight and that message will be sent to Dr. Leal to see if he can be seen tomorrow, 6/21/22.    RAYNE FentonN, RN

## 2022-06-21 RX ORDER — METOCLOPRAMIDE 5 MG/1
5 TABLET ORAL 4 TIMES DAILY PRN
Qty: 15 TABLET | Refills: 0 | Status: SHIPPED | OUTPATIENT
Start: 2022-06-21 | End: 2023-12-13

## 2022-06-22 ENCOUNTER — OFFICE VISIT (OUTPATIENT)
Dept: UROLOGY | Facility: CLINIC | Age: 82
End: 2022-06-22
Payer: COMMERCIAL

## 2022-06-22 ENCOUNTER — MYC MEDICAL ADVICE (OUTPATIENT)
Dept: FAMILY MEDICINE | Facility: CLINIC | Age: 82
End: 2022-06-22

## 2022-06-22 VITALS — SYSTOLIC BLOOD PRESSURE: 130 MMHG | BODY MASS INDEX: 23.98 KG/M2 | WEIGHT: 176.8 LBS | DIASTOLIC BLOOD PRESSURE: 84 MMHG

## 2022-06-22 DIAGNOSIS — N40.1 BENIGN PROSTATIC HYPERPLASIA WITH URINARY RETENTION: Primary | ICD-10-CM

## 2022-06-22 DIAGNOSIS — R97.20 ELEVATED PROSTATE SPECIFIC ANTIGEN (PSA): ICD-10-CM

## 2022-06-22 DIAGNOSIS — R33.8 BENIGN PROSTATIC HYPERPLASIA WITH URINARY RETENTION: Primary | ICD-10-CM

## 2022-06-22 PROCEDURE — 51700 IRRIGATION OF BLADDER: CPT | Performed by: UROLOGY

## 2022-06-22 PROCEDURE — 99204 OFFICE O/P NEW MOD 45 MIN: CPT | Mod: 25 | Performed by: UROLOGY

## 2022-06-22 RX ORDER — TAMSULOSIN HYDROCHLORIDE 0.4 MG/1
0.4 CAPSULE ORAL EVERY EVENING
Qty: 90 CAPSULE | Refills: 3 | Status: SHIPPED | OUTPATIENT
Start: 2022-06-22

## 2022-06-22 RX ORDER — SILDENAFIL 50 MG/1
25 TABLET, FILM COATED ORAL DAILY PRN
Qty: 30 TABLET | Refills: 0 | Status: CANCELLED | OUTPATIENT
Start: 2022-06-22

## 2022-06-22 NOTE — PROGRESS NOTES
S: Billy Santana is a pleasant  81 year old male who was requested to be seen by  No Ref-Primary, Physician for a consult with regard to patient's urinary complaints.  Patient complains of urinary retention recently.  Catheter was placed in the ER with only 300 ml of urine drained.  He has no history of elevated PSA.  Symptoms have been on going for   many years(s).  Seems to be worsened over time.  His recent PSA was found to be   Prostate Specific Antigen Screen   Date Value Ref Range Status   06/17/2022 8.19 (H) 0.00 - 4.00 ug/L Final   .  His AUA Symptom Score:  high.  His QOL score:  high.    Current Outpatient Medications   Medication Sig Dispense Refill     alum & mag hydroxide-simethicone (MAALOX) 200-200-20 MG/5ML SUSP suspension Take 15 mLs by mouth every 4 hours as needed for indigestion       Apixaban Starter Pack (ELIQUIS DVT/PE STARTER PACK) 5 MG TBPK Take 10 mg by mouth 2 times daily for 7 days, THEN 5 mg 2 times daily for 23 days. 74 each 0     metoclopramide (REGLAN) 5 MG tablet Take 1 tablet (5 mg) by mouth 4 times daily as needed (nausea or bloating) 15 tablet 0     omeprazole (PRILOSEC) 20 MG DR capsule Take 20 mg by mouth 2 times daily       sucralfate (CARAFATE) 1 GM tablet Take 1 tablet (1 g) by mouth 4 times daily For stomach protection 120 tablet 0     tamsulosin (FLOMAX) 0.4 MG capsule Take 1 capsule (0.4 mg) by mouth every evening 90 capsule 3     Allergies   Allergen Reactions     No Known Drug Allergies      Past Medical History:   Diagnosis Date     Hiatal hernia      Past Surgical History:   Procedure Laterality Date     COLONOSCOPY N/A 6/10/2022    Procedure: COLONOSCOPY, WITH POLYPECTOMY;  Surgeon: Andres Peters MD;  Location:  GI     ESOPHAGOSCOPY, GASTROSCOPY, DUODENOSCOPY (EGD), COMBINED N/A 6/10/2022    Procedure: ESOPHAGOGASTRODUODENOSCOPY, WITH BIOPSY;  Surgeon: Andres Peters MD;  Location:  GI      No family history on file.  He does not have a family history of  prostate cancer.  Social History     Socioeconomic History     Marital status:      Spouse name: None     Number of children: None     Years of education: None     Highest education level: None   Tobacco Use     Smoking status: Never Smoker     Smokeless tobacco: Never Used   Substance and Sexual Activity     Alcohol use: Yes     Comment: rare     Drug use: Never     Sexual activity: Not Currently        REVIEW OF SYSTEMS  =================  C: NEGATIVE for fever, chills, change in weight  I: NEGATIVE for worrisome rashes, moles or lesions  E/M: NEGATIVE for ear, mouth and throat problems  R: NEGATIVE for significant cough or SHORTNESS OF BREATH  CV:  NEGATIVE for chest pain, palpitations or peripheral edema  GI: NEGATIVE for nausea, abdominal pain, heartburn, or change in bowel habits  NEURO: NEGATIVE numbness/weakness  : see HPI  PSYCH: NEGATIVE depression/anxiety  LYmph: no new enlarged lymph nodes  Ortho: no new trauma/movements           O: Exam:/84   Wt 80.2 kg (176 lb 12.8 oz)   BMI 23.98 kg/m     Constitutional: healthy, alert and no distress  Cardiovascular: negative, PMI normal.   Respiratory: negative, no evidence of respiratory distress  Gastrointestinal: Abdomen soft, non-tender. BS normal. No masses, organomegaly  : penis with singletary in placed.  Testis no masses.  No scrotal skin lesion.  Prostate 50 gm plus smooth.    Musculoskeletal: extremities normal- no gross deformities noted, gait normal and normal muscle tone  Skin: no suspicious lesions or rashes  Neurologic: Alert and oriented  Psychiatric: mentation appears normal. and affect normal/bright  Hematologic/Lymphatic/Immunologic: normal ant/post cervical, axillary, supraclavicular and inguinal nodes    Assessment/Plan:   (N40.1,  R33.8) Benign prostatic hyperplasia with urinary retention  (primary encounter diagnosis)  Comment: trial of void performed today. Patient retained about 150 ml.  Plan: start flomax           rtc in 2  weeks for bladder scan    (R97.20) Elevated prostate specific antigen (PSA)  Comment:    Plan: discussed pros and cons of psa screening/testin.  Discussed prevalence of prostate cancer and pros / cons of tx.    Recheck prn.

## 2022-06-22 NOTE — TELEPHONE ENCOUNTER
I have attempted to call the pt with the following message. I left a message for pt to call back. I will call back another time. Camilla Newsmoe, CMA

## 2022-06-22 NOTE — TELEPHONE ENCOUNTER
"Contacted pt to triage symptoms.     Wife is with pt and he gives verbal permission to speak with her.   Pt has been having bilateral LE swelling since Sunday. Per wife, this is new for pt.     Pt DENIES chest pain, SOB or difficulty breathing.   Pt has never been on lasix before. 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     Per pt and wife swelling is improved. He is able to walk around and do all his daily activities. Offered pt an appt for today and he declines. Offered later this week and he declines.   Wife would like to monitor and will have him seen if needed.     No additional questions at this time.     Becky Chris, RN, BSN        "

## 2022-06-27 ENCOUNTER — PRE VISIT (OUTPATIENT)
Dept: ONCOLOGY | Facility: CLINIC | Age: 82
End: 2022-06-27

## 2022-06-27 ENCOUNTER — PREP FOR PROCEDURE (OUTPATIENT)
Dept: SURGERY | Facility: CLINIC | Age: 82
End: 2022-06-27

## 2022-06-27 ENCOUNTER — ONCOLOGY VISIT (OUTPATIENT)
Dept: ONCOLOGY | Facility: CLINIC | Age: 82
End: 2022-06-27
Attending: EMERGENCY MEDICINE
Payer: COMMERCIAL

## 2022-06-27 VITALS
HEART RATE: 90 BPM | SYSTOLIC BLOOD PRESSURE: 129 MMHG | DIASTOLIC BLOOD PRESSURE: 78 MMHG | OXYGEN SATURATION: 98 % | RESPIRATION RATE: 16 BRPM | WEIGHT: 185.4 LBS | TEMPERATURE: 98.1 F | BODY MASS INDEX: 25.11 KG/M2 | HEIGHT: 72 IN

## 2022-06-27 DIAGNOSIS — K44.9 HIATAL HERNIA: Primary | ICD-10-CM

## 2022-06-27 DIAGNOSIS — K44.9 HIATAL HERNIA: ICD-10-CM

## 2022-06-27 PROCEDURE — G0463 HOSPITAL OUTPT CLINIC VISIT: HCPCS

## 2022-06-27 PROCEDURE — 99205 OFFICE O/P NEW HI 60 MIN: CPT | Performed by: THORACIC SURGERY (CARDIOTHORACIC VASCULAR SURGERY)

## 2022-06-27 RX ORDER — HEPARIN SODIUM 5000 [USP'U]/.5ML
5000 INJECTION, SOLUTION INTRAVENOUS; SUBCUTANEOUS
Status: CANCELLED | OUTPATIENT
Start: 2022-06-27

## 2022-06-27 RX ORDER — CEFAZOLIN SODIUM 2 G/50ML
2 SOLUTION INTRAVENOUS SEE ADMIN INSTRUCTIONS
Status: CANCELLED | OUTPATIENT
Start: 2022-06-27

## 2022-06-27 RX ORDER — CEFAZOLIN SODIUM 2 G/50ML
2 SOLUTION INTRAVENOUS
Status: CANCELLED | OUTPATIENT
Start: 2022-06-27

## 2022-06-27 ASSESSMENT — PAIN SCALES - GENERAL: PAINLEVEL: NO PAIN (0)

## 2022-06-27 NOTE — LETTER
6/27/2022         RE: Billy Santana  [address] [facility] [id number]        Dear Colleague,    Thank you for referring your patient, Billy Santana, to the [facility]. Please see a copy of my visit note below.    THORACIC SURGERY - NEW PATIENT OFFICE VISIT      I saw Дмитрий Santana at in consultation for the evaluation and treatment of a giant hiatal hernia.     HPI  Дмитрий Santana is a 81 year old male who was recently seen in the emergency department on 6/14/2022. He had been having trouble with belching, early satiety and weight loss. He was found to have H. Pylori on a breath test and was started on treatment. While doing bowel prep for a colonoscopy, he developed urinary retention and then gross hematuria and had a near syncopal event. He had not sought out medical care in at least 20 years.    He has lost about 40 pounds in the last two months, but has stabilized over the past couple of weeks. He was able to eat fine until 3 months ago. Since then, he has been trying to belch. He has no trouble swallowing. Belching helps to relieve his symptoms. He has bowel movements every day to every few days. He has been constipated of late and used an enema yesterday. He has no shortness of breath. He jogs up to 500 yards without stopping.     He has a history of bleeding ulcers when he was in his 20's, so he has taking mylanta for years, largely on a daily basis.     He was placed on Eliquis.     He was also found to have likely prostate cancer, with a PSA over 8.     Previsit Tests   CT chest 6/14/2022:    Giant hiatal hernia  Extensive bilateral pulmonary artery emboli in all lobes of the lungs  Bilateral lung nodules, up to 7 mm    Colonoscopy 6/10/2022: diverticulae; tubular adenoma  EGD 6/10/2022: Chronic gastritis, h pylori negative on IHC    H pylori breath test 6/2/2022: Positive    PSA 6/17/2022:  8.19    PMH  Reviewed, as below  H.  Pylori infection  Past Medical History:    Diagnosis Date     Hiatal hernia         PSH  Reviewed, as below    Past Surgical History:   Procedure Laterality Date     COLONOSCOPY N/A 6/10/2022    Procedure: COLONOSCOPY, WITH POLYPECTOMY;  Surgeon: Andres Peters MD;  Location: [facility]     ESOPHAGOSCOPY, GASTROSCOPY, DUODENOSCOPY (EGD), COMBINED N/A 6/10/2022    Procedure: ESOPHAGOGASTRODUODENOSCOPY, WITH BIOPSY;  Surgeon: Andres Peters MD;  Location: [facility]        ETOH:  Rare  TOB:  2 packs per day for 5 years. Quit 1960.  Ages 14-17     Physical examination  /78   Pulse 90   Temp 98.1  F (36.7  C) (Oral)   Resp 16   Ht 1.829 m (6')   Wt 84.1 kg (185 lb 6.4 oz)   SpO2 98%   BMI 25.14 kg/m     Physical Exam  Eyes:      Conjunctiva/sclera: Conjunctivae normal.   Pulmonary:      Effort: Pulmonary effort is normal.   Musculoskeletal:         General: Normal range of motion.   Skin:     General: Skin is warm and dry.   Neurological:      Mental Status: He is alert and oriented to person, place, and time.   Psychiatric:         Mood and Affect: Mood normal.         Behavior: Behavior normal.         Thought Content: Thought content normal.         Judgment: Judgment normal.          From a personal perspective, he is here with his wife. 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 They have two sons and 6 grandchildren and 2 greatgrandchildren.     IMPRESSION (K44.9) Hiatal hernia  This person is a 81 year old male with a symptomatic giant hiatal hernia and a simultaneous diagnosis of bilateral extensive pulmonary emboli.    PLAN  I spent 60 min on the date of the encounter in chart review, patient visit, review of tests, documentation and/or discussion with other providers about the issues documented above. I reviewed the plan as follows:    Procedure planned: Robot-assisted laparoscopic hiatal hernia repair,  "gastrostomy, possible gastroplasty, NO fundoplication.    The risks include, but are not limited to the following.  There is a risk of injury to the stomach, esophagus or abdominal contents.  There is a risk of injury to the vagus nerves, which could result in functional emptying issues for the stomach.  There is a risk of problems with the wrap, which can include difficulty swallowing, persistent reflux and pain.  Coughing or retching/vomiting in the weeks after surgery can result in breakdown of the repair.  If the esophagus does not reach the abdominal cavity, a \"new\" esophagus will be made out of the top of the stomach.  This gastroplasty can dilate over time, causing new swallowing difficulties, and continues to produce acid.  The wrap may loosen over time and need to be revised with a new operation.  The lung cavities may be entered and tubes placed to evacuate air and fluid.   Finally, there is a risk of death.       Necessary Preop Tests & Appointments: Preoperative assessment clinic and Pulmonary function testing    Regional Anesthesia Plan: None    Anticoagulation Plan: Prophylactic Heparin       I appreciate the opportunity to participate in the care of your patient and will keep you updated.    Sincerely,        Michael Melgoza MD      Oncology Rooming Note    June 27, 2022 1:05 PM   Billy Santana is a 81 year old male who presents for:    Chief Complaint   Patient presents with     Oncology Clinic Visit     Initial Vitals: /78   Pulse 90   Temp 98.1  F (36.7  C) (Oral)   Resp 16   Ht 1.829 m (6')   Wt 84.1 kg (185 lb 6.4 oz)   SpO2 98%   BMI 25.14 kg/m   Estimated body mass index is 25.14 kg/m  as calculated from the following:    Height as of this encounter: 1.829 m (6').    Weight as of this encounter: 84.1 kg (185 lb 6.4 oz). Body surface area is 2.07 meters squared.  No Pain (0) Comment: Data Unavailable   No LMP for male patient.  Allergies reviewed: Yes  Medications reviewed: " Yes    Medications: Medication refills not needed today.  Pharmacy name entered into [facility]: [id number] - [place], [place] - [address]    Clinical concerns: None       Galina Gomez MA                Again, thank you for allowing me to participate in the care of your patient.        Sincerely,        Michael Melgoza MD

## 2022-06-27 NOTE — PROGRESS NOTES
THORACIC SURGERY - NEW PATIENT OFFICE VISIT      I saw Дмитрий Santana at in consultation for the evaluation and treatment of a giant hiatal hernia.     HPI  Дмитрий Santana is a 81 year old male who was recently seen in the emergency department on 6/14/2022. He had been having trouble with belching, early satiety and weight loss. He was found to have H. Pylori on a breath test and was started on treatment. While doing bowel prep for a colonoscopy, he developed urinary retention and then gross hematuria and had a near syncopal event. He had not sought out medical care in at least 20 years.    He has lost about 40 pounds in the last two months, but has stabilized over the past couple of weeks. He was able to eat fine until 3 months ago. Since then, he has been trying to belch. He has no trouble swallowing. Belching helps to relieve his symptoms. He has bowel movements every day to every few days. He has been constipated of late and used an enema yesterday. He has no shortness of breath. He jogs up to 500 yards without stopping.     He has a history of bleeding ulcers when he was in his 20's, so he has taking mylanta for years, largely on a daily basis.     He was placed on Eliquis.     He was also found to have likely prostate cancer, with a PSA over 8.     Previsit Tests   CT chest 6/14/2022:    Giant hiatal hernia  Extensive bilateral pulmonary artery emboli in all lobes of the lungs  Bilateral lung nodules, up to 7 mm    Colonoscopy 6/10/2022: diverticulae; tubular adenoma  EGD 6/10/2022: Chronic gastritis, h pylori negative on IHC    H pylori breath test 6/2/2022: Positive    PSA 6/17/2022:  8.19    PMH  Reviewed, as below  H.  Pylori infection  Past Medical History:   Diagnosis Date     Hiatal hernia         PSH  Reviewed, as below    Past Surgical History:   Procedure Laterality Date     COLONOSCOPY N/A 6/10/2022    Procedure: COLONOSCOPY, WITH POLYPECTOMY;  Surgeon: Andres Peters MD;  Location: [facility]      ESOPHAGOSCOPY, GASTROSCOPY, DUODENOSCOPY (EGD), COMBINED N/A 6/10/2022    Procedure: ESOPHAGOGASTRODUODENOSCOPY, WITH BIOPSY;  Surgeon: Andres Peters MD;  Location:  GI        ETOH:  Rare  TOB:  2 packs per day for 5 years. Quit 1960.  Ages 12-17     Physical examination  /78   Pulse 90   Temp 98.1  F (36.7  C) (Oral)   Resp 16   Ht 1.829 m (6')   Wt 84.1 kg (185 lb 6.4 oz)   SpO2 98%   BMI 25.14 kg/m     Physical Exam  Eyes:      Conjunctiva/sclera: Conjunctivae normal.   Pulmonary:      Effort: Pulmonary effort is normal.   Musculoskeletal:         General: Normal range of motion.   Skin:     General: Skin is warm and dry.   Neurological:      Mental Status: He is alert and oriented to person, place, and time.   Psychiatric:         Mood and Affect: Mood normal.         Behavior: Behavior normal.         Thought Content: Thought content normal.         Judgment: Judgment normal.          From a personal perspective, he is here with his wife. 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 They have two sons and 6 grandchildren and 2 greatgrandchildren.     IMPRESSION (K44.9) Hiatal hernia  This person is a 81 year old male with a symptomatic giant hiatal hernia and a simultaneous diagnosis of bilateral extensive pulmonary emboli.    PLAN  I spent 60 min on the date of the encounter in chart review, patient visit, review of tests, documentation and/or discussion with other providers about the issues documented above. I reviewed the plan as follows:    Procedure planned: Robot-assisted laparoscopic hiatal hernia repair, gastrostomy, possible gastroplasty, NO fundoplication.    The risks include, but are not limited to the following.  There is a risk of injury to the stomach, esophagus or abdominal contents.  There is a risk of injury to the vagus nerves, which could result  "in functional emptying issues for the stomach.  There is a risk of problems with the wrap, which can include difficulty swallowing, persistent reflux and pain.  Coughing or retching/vomiting in the weeks after surgery can result in breakdown of the repair.  If the esophagus does not reach the abdominal cavity, a \"new\" esophagus will be made out of the top of the stomach.  This gastroplasty can dilate over time, causing new swallowing difficulties, and continues to produce acid.  The wrap may loosen over time and need to be revised with a new operation.  The lung cavities may be entered and tubes placed to evacuate air and fluid.   Finally, there is a risk of death.       Necessary Preop Tests & Appointments: Preoperative assessment clinic and Pulmonary function testing    Regional Anesthesia Plan: None    Anticoagulation Plan: Prophylactic Heparin       I appreciate the opportunity to participate in the care of your patient and will keep you updated.    Sincerely,        Michael Melgoza MD    "

## 2022-06-27 NOTE — PROGRESS NOTES
Oncology Rooming Note    June 27, 2022 1:05 PM   Billy Santana is a 81 year old male who presents for:    Chief Complaint   Patient presents with     Oncology Clinic Visit     Initial Vitals: /78   Pulse 90   Temp 98.1  F (36.7  C) (Oral)   Resp 16   Ht 1.829 m (6')   Wt 84.1 kg (185 lb 6.4 oz)   SpO2 98%   BMI 25.14 kg/m   Estimated body mass index is 25.14 kg/m  as calculated from the following:    Height as of this encounter: 1.829 m (6').    Weight as of this encounter: 84.1 kg (185 lb 6.4 oz). Body surface area is 2.07 meters squared.  No Pain (0) Comment: Data Unavailable   No LMP for male patient.  Allergies reviewed: Yes  Medications reviewed: Yes    Medications: Medication refills not needed today.  Pharmacy name entered into [employer]: [facility] - [place], [place] - [address]    Clinical concerns: None       Galina Gomez MA

## 2022-06-28 DIAGNOSIS — I26.99 BILATERAL PULMONARY EMBOLISM (H): Primary | ICD-10-CM

## 2022-07-02 ENCOUNTER — MYC MEDICAL ADVICE (OUTPATIENT)
Dept: FAMILY MEDICINE | Facility: CLINIC | Age: 82
End: 2022-07-02

## 2022-07-02 DIAGNOSIS — K21.9 GASTROESOPHAGEAL REFLUX DISEASE, UNSPECIFIED WHETHER ESOPHAGITIS PRESENT: Primary | ICD-10-CM

## 2022-07-02 DIAGNOSIS — I26.94 MULTIPLE SUBSEGMENTAL PULMONARY EMBOLI WITHOUT ACUTE COR PULMONALE (H): ICD-10-CM

## 2022-07-06 ENCOUNTER — OFFICE VISIT (OUTPATIENT)
Dept: UROLOGY | Facility: CLINIC | Age: 82
End: 2022-07-06
Payer: COMMERCIAL

## 2022-07-06 VITALS
WEIGHT: 185.96 LBS | DIASTOLIC BLOOD PRESSURE: 74 MMHG | BODY MASS INDEX: 25.19 KG/M2 | SYSTOLIC BLOOD PRESSURE: 126 MMHG | HEIGHT: 72 IN

## 2022-07-06 DIAGNOSIS — N52.9 ERECTILE DYSFUNCTION, UNSPECIFIED ERECTILE DYSFUNCTION TYPE: ICD-10-CM

## 2022-07-06 DIAGNOSIS — R33.8 BENIGN PROSTATIC HYPERPLASIA WITH URINARY RETENTION: Primary | ICD-10-CM

## 2022-07-06 DIAGNOSIS — R97.20 ELEVATED PROSTATE SPECIFIC ANTIGEN (PSA): ICD-10-CM

## 2022-07-06 DIAGNOSIS — N40.1 BENIGN PROSTATIC HYPERPLASIA WITH URINARY RETENTION: Primary | ICD-10-CM

## 2022-07-06 PROCEDURE — 99214 OFFICE O/P EST MOD 30 MIN: CPT | Mod: 25 | Performed by: UROLOGY

## 2022-07-06 PROCEDURE — 51798 US URINE CAPACITY MEASURE: CPT | Performed by: UROLOGY

## 2022-07-06 RX ORDER — SILDENAFIL 100 MG/1
100 TABLET, FILM COATED ORAL DAILY PRN
Qty: 30 TABLET | Refills: 3 | Status: SHIPPED | OUTPATIENT
Start: 2022-07-06

## 2022-07-06 RX ORDER — FINASTERIDE 5 MG/1
5 TABLET, FILM COATED ORAL DAILY
Qty: 90 TABLET | Refills: 3 | Status: SHIPPED | OUTPATIENT
Start: 2022-07-06

## 2022-07-06 NOTE — PROGRESS NOTES
Chief Complaint   Patient presents with     RECHECK     BPH with Retention       Billy Santana is a 81 year old male who presents today for follow up of   Chief Complaint   Patient presents with     RECHECK     BPH with Retention   81-year-old male with recent history of urine retention.  He was started on Flomax.  He denies any significant urinary problems.  PSA was elevated at 8.19.  Patient also has erectile dysfunction for many years.  He has partial erections and not firm enough for penetration.    Current Outpatient Medications   Medication Sig Dispense Refill     alum & mag hydroxide-simethicone (MAALOX) 200-200-20 MG/5ML SUSP suspension Take 15 mLs by mouth every 4 hours as needed for indigestion       Apixaban Starter Pack (ELIQUIS DVT/PE STARTER PACK) 5 MG TBPK Take 10 mg by mouth 2 times daily for 7 days, THEN 5 mg 2 times daily for 23 days. 74 each 0     finasteride (PROSCAR) 5 MG tablet Take 1 tablet (5 mg) by mouth daily 90 tablet 3     metoclopramide (REGLAN) 5 MG tablet Take 1 tablet (5 mg) by mouth 4 times daily as needed (nausea or bloating) 15 tablet 0     omeprazole (PRILOSEC) 20 MG DR capsule Take 20 mg by mouth 2 times daily       sildenafil (VIAGRA) 100 MG tablet Take 1 tablet (100 mg) by mouth daily as needed (sex) Don't take tamsulosin at the same time 30 tablet 3     sucralfate (CARAFATE) 1 GM tablet Take 1 tablet (1 g) by mouth 4 times daily For stomach protection 120 tablet 0     tamsulosin (FLOMAX) 0.4 MG capsule Take 1 capsule (0.4 mg) by mouth every evening 90 capsule 3     Allergies   Allergen Reactions     No Known Drug Allergies       Past Medical History:   Diagnosis Date     Hiatal hernia      Past Surgical History:   Procedure Laterality Date     COLONOSCOPY N/A 6/10/2022    Procedure: COLONOSCOPY, WITH POLYPECTOMY;  Surgeon: Andres Peters MD;  Location:  GI     ESOPHAGOSCOPY, GASTROSCOPY, DUODENOSCOPY (EGD), COMBINED N/A 6/10/2022    Procedure: ESOPHAGOGASTRODUODENOSCOPY,  WITH BIOPSY;  Surgeon: Andres Peters MD;  Location: [facility]     No family history on file.  Social History     Socioeconomic History     Marital status:      Spouse name: None     Number of children: None     Years of education: None     Highest education level: None   Tobacco Use     Smoking status: Never Smoker     Smokeless tobacco: Never Used   Substance and Sexual Activity     Alcohol use: Yes     Comment: rare     Drug use: Never     Sexual activity: Not Currently       REVIEW OF SYSTEMS  =================  C: NEGATIVE for fever, chills, change in weight  I: NEGATIVE for worrisome rashes, moles or lesions  E/M: NEGATIVE for ear, mouth and throat problems  R: NEGATIVE for significant cough or SHORTNESS OF BREATH  CV:  NEGATIVE for chest pain, palpitations or peripheral edema  GI: NEGATIVE for nausea, abdominal pain, heartburn, or change in bowel habits  NEURO: NEGATIVE numbness/weakness  : see HPI  PSYCH: NEGATIVE depression/anxiety  LYmph: no new enlarged lymph nodes  Ortho: no new trauma/movements    Physical Exam:  /74   Ht 1.829 m (6')   Wt 84.4 kg (185 lb 15.4 oz)   BMI 25.22 kg/m     Patient is pleasant, in no acute distress, good general condition.  Lung: no evidence of respiratory distress    Abdomen: Soft, nondistended, non tender. No masses. No rebound or guarding.   Exam: Normal male.  Bladder scan with 0 residual urine.  Skin: Warm and dry.  No redness.  Psych: normal mood and affect  Neuro: alert and oriented  Musculaskeletal: moving all extremities    Assessment/Plan:   (N40.1,  R33.8) Benign prostatic hyperplasia with urinary retention  (primary encounter diagnosis)  Comment: Resolved.  Plan: Continue Flomax.  Add finasteride.  Recheck in 1 year.    (R97.20) Elevated prostate specific antigen (PSA)  Comment: Discussed natural history of prostate cancer.  Discussed pros and cons of further evaluation and treatment.  Patient elects observation.  Plan: Recheck PSA in 1  year.    (N52.9) Erectile dysfunction, unspecified erectile dysfunction type  Comment:    Plan: sildenafil (VIAGRA) 100 MG tablet        Side effects discussed.  Interaction with tamsulosin discussed.

## 2022-07-07 RX ORDER — SUCRALFATE 1 G/1
1 TABLET ORAL 4 TIMES DAILY
Qty: 120 TABLET | Refills: 0 | OUTPATIENT
Start: 2022-07-07

## 2022-07-07 RX ORDER — SUCRALFATE 1 G/1
1 TABLET ORAL 4 TIMES DAILY
Qty: 120 TABLET | Refills: 0 | Status: SHIPPED | OUTPATIENT
Start: 2022-07-07 | End: 2023-12-13

## 2022-07-07 RX ORDER — APIXABAN 5 MG (74)
KIT ORAL
Qty: 74 EACH | Refills: 0 | Status: CANCELLED | OUTPATIENT
Start: 2022-07-07 | End: 2022-08-06

## 2022-07-07 NOTE — TELEPHONE ENCOUNTER
Pending Prescriptions:                       Disp   Refills    Apixaban Starter Pack (ELIQUIS DVT/PE STAR*74 each0        Sig: Take 10 mg by mouth 2 times daily for 7 days, THEN 5           mg 2 times daily for 23 days.    Routing refill request to provider for review/approval because:  X Unsure on directions for Albaro Mcgraw RN  July 7, 2022

## 2022-07-08 ENCOUNTER — MYC MEDICAL ADVICE (OUTPATIENT)
Dept: FAMILY MEDICINE | Facility: CLINIC | Age: 82
End: 2022-07-08

## 2022-07-08 NOTE — TELEPHONE ENCOUNTER
Per [facility] pharmacy  Patient is wondering about getting a 15 day supply to make it to [facility].    Linda Cochran CMA

## 2022-07-12 RX ORDER — APIXABAN 5 MG (74)
KIT ORAL
Qty: 74 EACH | Refills: 0 | OUTPATIENT
Start: 2022-07-12 | End: 2022-08-11

## 2022-07-12 NOTE — TELEPHONE ENCOUNTER
Sent 7/8/22, with 1 month supply. Should not need refills at this time    Katia Mcpherson RN on 7/12/2022 at 10:59 AM

## 2022-09-21 ENCOUNTER — TELEPHONE (OUTPATIENT)
Dept: FAMILY MEDICINE | Facility: CLINIC | Age: 82
End: 2022-09-21

## 2022-09-21 NOTE — TELEPHONE ENCOUNTER
[facility] calling to see if patient is scheduled for hiatal hernia here or if he is supposed to have surgery there or if he has orders here. Information given, [facility] to call patient to see where he prefers to have procedure done.     Jhony Killian,RAYNEN, RN

## 2022-10-07 ENCOUNTER — TELEPHONE (OUTPATIENT)
Dept: SURGERY | Facility: CLINIC | Age: 82
End: 2022-10-07

## 2022-10-07 NOTE — TELEPHONE ENCOUNTER
Called patient to schedule procedure with Jyotsna Hurley stated they have decided to do this procedure at [facility] in [facility].     Message was sent to his care team.     Emmy Stiles  Shima-Op Coordinator  [phone]

## 2022-10-09 ENCOUNTER — HEALTH MAINTENANCE LETTER (OUTPATIENT)
Age: 82
End: 2022-10-09

## 2022-11-17 ENCOUNTER — MYC MEDICAL ADVICE (OUTPATIENT)
Dept: FAMILY MEDICINE | Facility: CLINIC | Age: 82
End: 2022-11-17

## 2023-05-20 ENCOUNTER — HOSPITAL ENCOUNTER (EMERGENCY)
Facility: CLINIC | Age: 83
Discharge: HOME | End: 2023-05-20
Attending: EMERGENCY MEDICINE | Admitting: EMERGENCY MEDICINE
Payer: COMMERCIAL

## 2023-05-20 ENCOUNTER — APPOINTMENT (OUTPATIENT)
Dept: GENERAL RADIOLOGY | Facility: CLINIC | Age: 83
End: 2023-05-20
Attending: EMERGENCY MEDICINE
Payer: COMMERCIAL

## 2023-05-20 ENCOUNTER — APPOINTMENT (OUTPATIENT)
Dept: CT IMAGING | Facility: CLINIC | Age: 83
End: 2023-05-20
Attending: EMERGENCY MEDICINE
Payer: COMMERCIAL

## 2023-05-20 VITALS
OXYGEN SATURATION: 95 % | BODY MASS INDEX: 23.84 KG/M2 | WEIGHT: 176 LBS | RESPIRATION RATE: 20 BRPM | HEART RATE: 66 BPM | HEIGHT: 72 IN | TEMPERATURE: 97.6 F | SYSTOLIC BLOOD PRESSURE: 143 MMHG | DIASTOLIC BLOOD PRESSURE: 78 MMHG

## 2023-05-20 DIAGNOSIS — S01.511A LIP LACERATION, INITIAL ENCOUNTER: ICD-10-CM

## 2023-05-20 DIAGNOSIS — S02.5XXA CLOSED FRACTURE OF TOOTH, INITIAL ENCOUNTER: ICD-10-CM

## 2023-05-20 PROCEDURE — 70486 CT MAXILLOFACIAL W/O DYE: CPT

## 2023-05-20 PROCEDURE — 99284 EMERGENCY DEPT VISIT MOD MDM: CPT | Mod: 25 | Performed by: EMERGENCY MEDICINE

## 2023-05-20 PROCEDURE — 250N000011 HC RX IP 250 OP 636: Performed by: EMERGENCY MEDICINE

## 2023-05-20 PROCEDURE — 73110 X-RAY EXAM OF WRIST: CPT | Mod: RT

## 2023-05-20 PROCEDURE — 90471 IMMUNIZATION ADMIN: CPT | Performed by: EMERGENCY MEDICINE

## 2023-05-20 PROCEDURE — 90715 TDAP VACCINE 7 YRS/> IM: CPT | Performed by: EMERGENCY MEDICINE

## 2023-05-20 PROCEDURE — 12011 RPR F/E/E/N/L/M 2.5 CM/<: CPT | Performed by: EMERGENCY MEDICINE

## 2023-05-20 RX ORDER — AMOXICILLIN 500 MG/1
500 CAPSULE ORAL 3 TIMES DAILY
Qty: 21 CAPSULE | Refills: 0 | Status: SHIPPED | OUTPATIENT
Start: 2023-05-20 | End: 2023-05-27

## 2023-05-20 RX ADMIN — CLOSTRIDIUM TETANI TOXOID ANTIGEN (FORMALDEHYDE INACTIVATED), CORYNEBACTERIUM DIPHTHERIAE TOXOID ANTIGEN (FORMALDEHYDE INACTIVATED), BORDETELLA PERTUSSIS TOXOID ANTIGEN (GLUTARALDEHYDE INACTIVATED), BORDETELLA PERTUSSIS FILAMENTOUS HEMAGGLUTININ ANTIGEN (FORMALDEHYDE INACTIVATED), BORDETELLA PERTUSSIS PERTACTIN ANTIGEN, AND BORDETELLA PERTUSSIS FIMBRIAE 2/3 ANTIGEN 0.5 ML: 5; 2; 2.5; 5; 3; 5 INJECTION, SUSPENSION INTRAMUSCULAR at 10:49

## 2023-05-20 ASSESSMENT — ACTIVITIES OF DAILY LIVING (ADL): ADLS_ACUITY_SCORE: 35

## 2023-05-20 NOTE — ED TRIAGE NOTES
Pt presents with concerns of facial injuries from a fall.  Pt was playing tennis when he went for a ball, fell striking his face.  Denies LOC.  Right wrist pain and abrasion.  Right eye edema and laceration on the lip. Pt also broke a tooth.      Triage Assessment       Row Name 05/20/23 0905       Triage Assessment (Adult)    Airway WDL WDL       Respiratory WDL    Respiratory WDL WDL       Peripheral/Neurovascular WDL    Peripheral Neurovascular WDL WDL       Cognitive/Neuro/Behavioral WDL    Cognitive/Neuro/Behavioral WDL WDL

## 2023-05-20 NOTE — DISCHARGE INSTRUCTIONS
Take take the amoxicillin as directed for prophylaxis since you have a tooth fracture.  Please follow-up with your dentist as soon as possible.  Return to the emergency department if you develop dizziness, confusion, lightheadedness or other signs of more serious head injury.  The sutures that I placed are dissolvable and will eventually dissolve on their own.  If you get increased swelling or spreading redness return to the ER for reevaluation.  Glad there was nothing else broken other than your tooth. It was a pleasure to meet you.

## 2023-05-20 NOTE — ED PROVIDER NOTES
History     Chief Complaint   Patient presents with     Facial Injury     HPI  Billy Santana is a 82 year old male who presents to the emergency department secondary to a fall and injury of his face.  He was playing tennis this morning and he was running quickly to try to get a ball and he tripped and fell flat on his face.  He did not have loss of consciousness, confusion, visual disturbance, dizziness, excessive bleeding.  He chipped a tooth in his upper jaw.  He lacerated his upper lip.  He sustained a minor injury to his right wrist and hand with an abrasion.  He sustained a contusion to his right cheekbone.  He does not have much pain at all.  He feels pretty well overall.  His tetanus vaccine is not up-to-date.    Allergies:  Allergies   Allergen Reactions     No Known Drug Allergy        Problem List:    Patient Active Problem List    Diagnosis Date Noted     Health Care Home 12/28/2012     Priority: Cali Beauchamp RN-PHN  FPA / [facility]   [phone]    DX V65.8 REPLACED WITH 58783 [facility] (04/08/2013)          Past Medical History:    Past Medical History:   Diagnosis Date     Hiatal hernia        Past Surgical History:    Past Surgical History:   Procedure Laterality Date     COLONOSCOPY N/A 6/10/2022    Procedure: COLONOSCOPY, WITH POLYPECTOMY;  Surgeon: Andres Peters MD;  Location:  GI     ESOPHAGOSCOPY, GASTROSCOPY, DUODENOSCOPY (EGD), COMBINED N/A 6/10/2022    Procedure: ESOPHAGOGASTRODUODENOSCOPY, WITH BIOPSY;  Surgeon: Andres Peters MD;  Location:  GI       Family History:    No family history on file.    Social History:  Marital Status:   [2]  Social History     Tobacco Use     Smoking status: Never     Smokeless tobacco: Never   Substance Use Topics     Alcohol use: Yes     Comment: rare     Drug use: Never        Medications:    amoxicillin (AMOXIL) 500 MG capsule  alum & mag hydroxide-simethicone (MAALOX) 200-200-20 MG/5ML SUSP  suspension  finasteride (PROSCAR) 5 MG tablet  metoclopramide (REGLAN) 5 MG tablet  omeprazole (PRILOSEC) 20 MG DR capsule  sildenafil (VIAGRA) 100 MG tablet  sucralfate (CARAFATE) 1 GM tablet  tamsulosin (FLOMAX) 0.4 MG capsule          Review of Systems   All other systems reviewed and are negative.      Physical Exam   BP: (!) 148/64  Pulse: 67  Temp: 97.6  F (36.4  C)  Resp: 18  Height: 182.9 cm (6')  Weight: 79.8 kg (176 lb)  SpO2: 98 %      Physical Exam  Vitals and nursing note reviewed.   Constitutional:       General: He is not in acute distress.     Appearance: Normal appearance. He is well-developed. He is not diaphoretic.   HENT:      Head: Atraumatic.      Comments: Contusion/abrasion to the right cheek, no cephalohematoma.     Right Ear: External ear normal.      Left Ear: External ear normal.      Nose: Nose normal.      Mouth/Throat:      Mouth: Mucous membranes are moist.   Eyes:      General: No scleral icterus.     Extraocular Movements: Extraocular movements intact.      Conjunctiva/sclera: Conjunctivae normal.   Cardiovascular:      Rate and Rhythm: Normal rate.   Pulmonary:      Effort: Pulmonary effort is normal.   Musculoskeletal:         General: No swelling, tenderness, deformity or signs of injury. Normal range of motion.      Cervical back: Normal range of motion and neck supple.      Right lower leg: No edema.      Left lower leg: No edema.      Comments: Minimal tenderness over the right hand but full range of motion.   Skin:     General: Skin is warm and dry.      Findings: No rash.      Comments: There is a right upper lip laceration and near avulsion of tissue proximately 1.5 cm in length.  Abrasion to the right distal wrist dorsally.   Neurological:      General: No focal deficit present.      Mental Status: He is alert and oriented to person, place, and time.   Psychiatric:         Mood and Affect: Mood normal.         Behavior: Behavior normal.         ED Course                  Procedures    Right upper lip repair  [facility] Procedure Note        Laceration Repair:    Performed by: Yovany Britt MD  Authorized by: Yovany Britt MD  Consent given by: Patient who states understanding of the procedure being performed after discussing the risks, benefits and alternatives.    Preparation: Patient was prepped and draped in usual sterile fashion.  Irrigation solution: saline    Body area:upper lip  Laceration length: 2cm  Contamination: The wound is not contaminated.  Foreign bodies:none  Tendon involvement: none  Anesthesia:infraorbital nerve block on the right plus some local over vermillion border  Local anesthetic: Bupivacaine 0.5% initially then lidocaine  Anesthetic total: 5ml    Debridement: none  Skin closure: Closed with with 9 x SQ 4.0 Vicryl Sutures  Technique: interrupted  Approximation: close  Approximation difficulty: simple    Patient tolerance: Patient tolerated the procedure well with no immediate complications.           Results for orders placed or performed during the hospital encounter of 05/20/23 (from the past 24 hour(s))   XR Wrist Right G/E 3 Views    Narrative    EXAM: XR WRIST RIGHT G/E 3 VIEWS  LOCATION: [facility]  DATE/TIME: 05/20/2023, 9:26 AM CDT    INDICATION: Right wrist pain.  COMPARISON: None.      Impression    IMPRESSION: Anatomic alignment right wrist. No acute displaced right wrist fracture. No significant right wrist soft tissue swelling. Mild right thumb carpometacarpal joint osteoarthritis.        CT Facial Bones without Contrast    Narrative    EXAM: CT FACIAL BONES WITHOUT CONTRAST  LOCATION: [facility]  DATE/TIME: 05/20/2023, 9:36 AM CDT    INDICATION: Fall. Playing tennis. Face planted.  COMPARISON: None.  TECHNIQUE: Routine CT Maxillofacial without IV contrast. Multiplanar reformats. Dose reduction techniques were used.     FINDINGS:  OSSEOUS STRUCTURES/SOFT TISSUES:  There is a small soft tissue contusion of the right cheek. No facial bone fracture or malalignment. No evidence for dental trauma or periapical abscess.    ORBITAL CONTENTS: No acute abnormality.    SINUSES: There is scattered fluid and mucosal thickening in the ethmoid air cells, maxillary sinuses bilaterally and sphenoid sinus. Possible acute right maxillary sinusitis.    VISUALIZED INTRACRANIAL CONTENTS: No acute abnormality.       Impression    IMPRESSION:   1.  Small soft tissue contusion of the right cheek.  2.  Possible acute right maxillary sinusitis.  3.  No fractures.           Medications   Tdap (tetanus-diphtheria-acell pertussis) (ADACEL) injection 0.5 mL (0.5 mLs Intramuscular $Given 5/20/23 1045)       Assessments & Plan (with Medical Decision Making)  82-year-old male with a fall while playing tennis sustaining a contusion to his right cheek and a laceration to his right upper lip.  Laceration was repaired as above with absorbable sutures.  He did go along the vermilion border.  I was careful to approximate the edges carefully.  The laceration did go into the lip on the inside.  He has a fractured tooth in the upper jaw so we will treat him with prophylactic antibiotics and advised him on dental follow-up.  The diagnosis, treatment options, risks and follow-up discussed with a competent patient and his wife who agree with the plan.     I have reviewed the nursing notes.    I have reviewed the findings, diagnosis, plan and need for follow up with the patient.          Medical Decision Making   The patient's presentation was of moderate complexity (an acute complicated injury).    The patient's evaluation involved:  ordering and/or review of 1 test(s) in this encounter (see separate area of note for details)    The patient's management necessitated moderate risk (prescription drug management including medications given in the ED).        New Prescriptions    AMOXICILLIN (AMOXIL) 500 MG CAPSULE    Take  1 capsule (500 mg) by mouth 3 times daily for 7 days       Final diagnoses:   Lip laceration, initial encounter   Closed fracture of tooth, initial encounter       5/20/2023   [facility] EMERGENCY DEPT     Yovany Britt MD  05/20/23 3807

## 2023-05-20 NOTE — ED NOTES
Wound cleaned with sterile water and gauze in preparation for repair of the lacerated wound to his upper lip, PT states that he requires additional numbing agent prior to the procedure, MD informed.

## 2023-05-21 ENCOUNTER — HEALTH MAINTENANCE LETTER (OUTPATIENT)
Age: 83
End: 2023-05-21

## 2023-12-13 ENCOUNTER — HOSPITAL ENCOUNTER (EMERGENCY)
Facility: CLINIC | Age: 83
Discharge: OTHER INSTITUTION | End: 2023-12-13
Attending: STUDENT IN AN ORGANIZED HEALTH CARE EDUCATION/TRAINING PROGRAM | Admitting: STUDENT IN AN ORGANIZED HEALTH CARE EDUCATION/TRAINING PROGRAM
Payer: COMMERCIAL

## 2023-12-13 ENCOUNTER — APPOINTMENT (OUTPATIENT)
Dept: CT IMAGING | Facility: CLINIC | Age: 83
End: 2023-12-13
Attending: STUDENT IN AN ORGANIZED HEALTH CARE EDUCATION/TRAINING PROGRAM
Payer: COMMERCIAL

## 2023-12-13 VITALS
WEIGHT: 175 LBS | RESPIRATION RATE: 22 BRPM | TEMPERATURE: 98.2 F | DIASTOLIC BLOOD PRESSURE: 79 MMHG | BODY MASS INDEX: 23.7 KG/M2 | OXYGEN SATURATION: 97 % | HEIGHT: 72 IN | HEART RATE: 64 BPM | SYSTOLIC BLOOD PRESSURE: 120 MMHG

## 2023-12-13 DIAGNOSIS — R79.89 ELEVATED TROPONIN: ICD-10-CM

## 2023-12-13 DIAGNOSIS — I24.9 ACS (ACUTE CORONARY SYNDROME) (H): ICD-10-CM

## 2023-12-13 LAB
ALBUMIN SERPL BCG-MCNC: 4.2 G/DL (ref 3.5–5.2)
ALP SERPL-CCNC: 92 U/L (ref 40–150)
ALT SERPL W P-5'-P-CCNC: 33 U/L (ref 0–70)
ANION GAP SERPL CALCULATED.3IONS-SCNC: 17 MMOL/L (ref 7–15)
AST SERPL W P-5'-P-CCNC: 64 U/L (ref 0–45)
BASOPHILS # BLD AUTO: 0 10E3/UL (ref 0–0.2)
BASOPHILS NFR BLD AUTO: 0 %
BILIRUB SERPL-MCNC: 1.1 MG/DL
BUN SERPL-MCNC: 19.3 MG/DL (ref 8–23)
CALCIUM SERPL-MCNC: 9.5 MG/DL (ref 8.8–10.2)
CHLORIDE SERPL-SCNC: 102 MMOL/L (ref 98–107)
CREAT SERPL-MCNC: 0.84 MG/DL (ref 0.67–1.17)
DEPRECATED HCO3 PLAS-SCNC: 19 MMOL/L (ref 22–29)
EGFRCR SERPLBLD CKD-EPI 2021: 88 ML/MIN/1.73M2
EOSINOPHIL # BLD AUTO: 0 10E3/UL (ref 0–0.7)
EOSINOPHIL NFR BLD AUTO: 0 %
ERYTHROCYTE [DISTWIDTH] IN BLOOD BY AUTOMATED COUNT: 12.5 % (ref 10–15)
GLUCOSE SERPL-MCNC: 149 MG/DL (ref 70–99)
HCT VFR BLD AUTO: 39.5 % (ref 40–53)
HGB BLD-MCNC: 14 G/DL (ref 13.3–17.7)
IMM GRANULOCYTES # BLD: 0.1 10E3/UL
IMM GRANULOCYTES NFR BLD: 0 %
LIPASE SERPL-CCNC: 22 U/L (ref 13–60)
LYMPHOCYTES # BLD AUTO: 0.5 10E3/UL (ref 0.8–5.3)
LYMPHOCYTES NFR BLD AUTO: 4 %
MCH RBC QN AUTO: 32.6 PG (ref 26.5–33)
MCHC RBC AUTO-ENTMCNC: 35.4 G/DL (ref 31.5–36.5)
MCV RBC AUTO: 92 FL (ref 78–100)
MONOCYTES # BLD AUTO: 0.2 10E3/UL (ref 0–1.3)
MONOCYTES NFR BLD AUTO: 2 %
NEUTROPHILS # BLD AUTO: 11 10E3/UL (ref 1.6–8.3)
NEUTROPHILS NFR BLD AUTO: 94 %
NRBC # BLD AUTO: 0 10E3/UL
NRBC BLD AUTO-RTO: 0 /100
PLATELET # BLD AUTO: 260 10E3/UL (ref 150–450)
POTASSIUM SERPL-SCNC: 4.3 MMOL/L (ref 3.4–5.3)
PROT SERPL-MCNC: 6.6 G/DL (ref 6.4–8.3)
RBC # BLD AUTO: 4.29 10E6/UL (ref 4.4–5.9)
SODIUM SERPL-SCNC: 138 MMOL/L (ref 135–145)
TROPONIN T SERPL HS-MCNC: 391 NG/L
TROPONIN T SERPL HS-MCNC: 493 NG/L
WBC # BLD AUTO: 11.7 10E3/UL (ref 4–11)

## 2023-12-13 PROCEDURE — 99291 CRITICAL CARE FIRST HOUR: CPT | Mod: 25 | Performed by: STUDENT IN AN ORGANIZED HEALTH CARE EDUCATION/TRAINING PROGRAM

## 2023-12-13 PROCEDURE — C9113 INJ PANTOPRAZOLE SODIUM, VIA: HCPCS | Performed by: STUDENT IN AN ORGANIZED HEALTH CARE EDUCATION/TRAINING PROGRAM

## 2023-12-13 PROCEDURE — 85025 COMPLETE CBC W/AUTO DIFF WBC: CPT | Performed by: STUDENT IN AN ORGANIZED HEALTH CARE EDUCATION/TRAINING PROGRAM

## 2023-12-13 PROCEDURE — 250N000013 HC RX MED GY IP 250 OP 250 PS 637: Performed by: STUDENT IN AN ORGANIZED HEALTH CARE EDUCATION/TRAINING PROGRAM

## 2023-12-13 PROCEDURE — 93005 ELECTROCARDIOGRAM TRACING: CPT | Mod: 76 | Performed by: STUDENT IN AN ORGANIZED HEALTH CARE EDUCATION/TRAINING PROGRAM

## 2023-12-13 PROCEDURE — 80053 COMPREHEN METABOLIC PANEL: CPT | Performed by: STUDENT IN AN ORGANIZED HEALTH CARE EDUCATION/TRAINING PROGRAM

## 2023-12-13 PROCEDURE — 83690 ASSAY OF LIPASE: CPT | Performed by: STUDENT IN AN ORGANIZED HEALTH CARE EDUCATION/TRAINING PROGRAM

## 2023-12-13 PROCEDURE — 84484 ASSAY OF TROPONIN QUANT: CPT | Mod: 91 | Performed by: STUDENT IN AN ORGANIZED HEALTH CARE EDUCATION/TRAINING PROGRAM

## 2023-12-13 PROCEDURE — 96365 THER/PROPH/DIAG IV INF INIT: CPT | Performed by: STUDENT IN AN ORGANIZED HEALTH CARE EDUCATION/TRAINING PROGRAM

## 2023-12-13 PROCEDURE — 96374 THER/PROPH/DIAG INJ IV PUSH: CPT | Mod: 59 | Performed by: STUDENT IN AN ORGANIZED HEALTH CARE EDUCATION/TRAINING PROGRAM

## 2023-12-13 PROCEDURE — 96366 THER/PROPH/DIAG IV INF ADDON: CPT | Performed by: STUDENT IN AN ORGANIZED HEALTH CARE EDUCATION/TRAINING PROGRAM

## 2023-12-13 PROCEDURE — 250N000011 HC RX IP 250 OP 636: Performed by: STUDENT IN AN ORGANIZED HEALTH CARE EDUCATION/TRAINING PROGRAM

## 2023-12-13 PROCEDURE — 93010 ELECTROCARDIOGRAM REPORT: CPT | Performed by: STUDENT IN AN ORGANIZED HEALTH CARE EDUCATION/TRAINING PROGRAM

## 2023-12-13 PROCEDURE — 93010 ELECTROCARDIOGRAM REPORT: CPT | Mod: 59 | Performed by: STUDENT IN AN ORGANIZED HEALTH CARE EDUCATION/TRAINING PROGRAM

## 2023-12-13 PROCEDURE — 36415 COLL VENOUS BLD VENIPUNCTURE: CPT | Performed by: STUDENT IN AN ORGANIZED HEALTH CARE EDUCATION/TRAINING PROGRAM

## 2023-12-13 PROCEDURE — 93005 ELECTROCARDIOGRAM TRACING: CPT | Performed by: STUDENT IN AN ORGANIZED HEALTH CARE EDUCATION/TRAINING PROGRAM

## 2023-12-13 PROCEDURE — 71250 CT THORAX DX C-: CPT

## 2023-12-13 PROCEDURE — 96375 TX/PRO/DX INJ NEW DRUG ADDON: CPT | Performed by: STUDENT IN AN ORGANIZED HEALTH CARE EDUCATION/TRAINING PROGRAM

## 2023-12-13 RX ORDER — ASPIRIN 325 MG
325 TABLET, DELAYED RELEASE (ENTERIC COATED) ORAL ONCE
Status: COMPLETED | OUTPATIENT
Start: 2023-12-13 | End: 2023-12-13

## 2023-12-13 RX ORDER — HEPARIN SODIUM 10000 [USP'U]/100ML
0-5000 INJECTION, SOLUTION INTRAVENOUS CONTINUOUS
Status: DISCONTINUED | OUTPATIENT
Start: 2023-12-13 | End: 2023-12-13 | Stop reason: HOSPADM

## 2023-12-13 RX ORDER — ONDANSETRON 2 MG/ML
4 INJECTION INTRAMUSCULAR; INTRAVENOUS EVERY 30 MIN PRN
Status: DISCONTINUED | OUTPATIENT
Start: 2023-12-13 | End: 2023-12-13 | Stop reason: HOSPADM

## 2023-12-13 RX ORDER — ACETAMINOPHEN 325 MG/1
325 TABLET ORAL EVERY 4 HOURS PRN
COMMUNITY

## 2023-12-13 RX ORDER — IOPAMIDOL 755 MG/ML
500 INJECTION, SOLUTION INTRAVASCULAR ONCE
Status: DISCONTINUED | OUTPATIENT
Start: 2023-12-13 | End: 2023-12-13 | Stop reason: HOSPADM

## 2023-12-13 RX ADMIN — ONDANSETRON 4 MG: 2 INJECTION INTRAMUSCULAR; INTRAVENOUS at 19:23

## 2023-12-13 RX ADMIN — HEPARIN SODIUM 950 UNITS/HR: 10000 INJECTION, SOLUTION INTRAVENOUS at 17:45

## 2023-12-13 RX ADMIN — PANTOPRAZOLE SODIUM 40 MG: 40 INJECTION, POWDER, FOR SOLUTION INTRAVENOUS at 16:51

## 2023-12-13 RX ADMIN — TICAGRELOR 180 MG: 90 TABLET ORAL at 18:30

## 2023-12-13 RX ADMIN — ASPIRIN 325 MG: 325 TABLET, COATED ORAL at 17:38

## 2023-12-13 RX ADMIN — NITROGLYCERIN 15 MG: 20 OINTMENT TOPICAL at 17:38

## 2023-12-13 ASSESSMENT — ACTIVITIES OF DAILY LIVING (ADL)
ADLS_ACUITY_SCORE: 35

## 2023-12-13 NOTE — ED TRIAGE NOTES
C/o sudden onset of nausea about an hour after eating breakfast.  No vomiting or diarrhea.  Repeated belching and left sided chest pain.      Triage Assessment (Adult)       Row Name 12/13/23 4965          Triage Assessment    Airway WDL WDL        Respiratory WDL    Respiratory WDL WDL        Cardiac WDL    Cardiac WDL X

## 2023-12-13 NOTE — ED PROVIDER NOTES
History     Chief Complaint   Patient presents with    Nausea     HPI  Billy Santana is a 83 year old male with history of H. pylori infection, hiatal hernia s/p surgical repair presents for evaluation of nausea, belching, and left-sided chest discomfort.  Symptoms started shortly after eating cereal for breakfast this morning.  About 20 minutes later he experienced fairly rapid onset of nausea.  This has been associated with mild left-sided chest pain under the ribs and frequent belching.  Tums only provided a small amount of relief.  Patient has a single loose stool daily which happened this morning as well; he denies melena or hematochezia.  He otherwise denies recent illness, fever, shortness of breath or palpitations, abdominal pain, changes in bowel or urinary habits, other complaints today.    Patient denies any prior cardiac history.    Allergies:  Allergies   Allergen Reactions    No Known Drug Allergy        Problem List:    Patient Active Problem List    Diagnosis Date Noted    Health Care Home 12/28/2012     Priority: Cali Beauchamp RN-PHN  [facility] / [facility]   [phone]    DX V65.8 REPLACED WITH 22554 [facility] (04/08/2013)          Past Medical History:    Past Medical History:   Diagnosis Date    Hiatal hernia        Past Surgical History:    Past Surgical History:   Procedure Laterality Date    COLONOSCOPY N/A 6/10/2022    Procedure: COLONOSCOPY, WITH POLYPECTOMY;  Surgeon: Andres Peters MD;  Location:  GI    ESOPHAGOSCOPY, GASTROSCOPY, DUODENOSCOPY (EGD), COMBINED N/A 6/10/2022    Procedure: ESOPHAGOGASTRODUODENOSCOPY, WITH BIOPSY;  Surgeon: Andres Peters MD;  Location:  GI       Family History:    No family history on file.    Social History:  Marital Status:   [2]  Social History     Tobacco Use    Smoking status: Never    Smokeless tobacco: Never   Substance Use Topics    Alcohol use: Yes     Comment: rare    Drug use: Never         Medications:    acetaminophen (TYLENOL) 325 MG tablet  alum & mag hydroxide-simethicone (MAALOX) 200-200-20 MG/5ML SUSP suspension  bismuth subsalicylate (PEPTO BISMOL) 262 MG/15ML suspension  finasteride (PROSCAR) 5 MG tablet  sildenafil (VIAGRA) 100 MG tablet  tamsulosin (FLOMAX) 0.4 MG capsule      Review of Systems   All other systems reviewed and are negative.  See HPI.    Physical Exam   BP: 115/64  Pulse: 62  Temp: 98.1  F (36.7  C)  Resp: 18  Height: 182.9 cm (6')  Weight: 79.4 kg (175 lb)  SpO2: 100 %      Physical Exam  Vitals and nursing note reviewed.   Constitutional:       General: He is not in acute distress.     Appearance: Normal appearance. He is not toxic-appearing.      Comments: Appears slightly uncomfortable.  Frequent belching.   HENT:      Head: Normocephalic and atraumatic.      Nose: Nose normal. No congestion or rhinorrhea.      Mouth/Throat:      Mouth: Mucous membranes are moist.      Pharynx: Oropharynx is clear.   Eyes:      General: No scleral icterus.     Extraocular Movements: Extraocular movements intact.      Conjunctiva/sclera: Conjunctivae normal.      Pupils: Pupils are equal, round, and reactive to light.   Cardiovascular:      Rate and Rhythm: Normal rate and regular rhythm.      Pulses: Normal pulses.      Heart sounds: Normal heart sounds. No murmur heard.  Pulmonary:      Effort: Pulmonary effort is normal. No respiratory distress.      Breath sounds: Normal breath sounds. No wheezing, rhonchi or rales.      Comments: No reproducible chest wall tenderness to palpation.  No bony abnormalities.  No overlying skin changes.  Chest:      Chest wall: No tenderness.   Abdominal:      General: Abdomen is flat. There is no distension.      Palpations: Abdomen is soft.      Tenderness: There is no abdominal tenderness. There is no right CVA tenderness, left CVA tenderness, guarding or rebound.      Comments: Abdomen is entirely nontender and nondistended.  There is no tenderness  even to deep palpation of the left upper quadrant.   Musculoskeletal:         General: No tenderness or deformity. Normal range of motion.      Cervical back: Normal range of motion and neck supple. No tenderness.   Skin:     General: Skin is warm.      Capillary Refill: Capillary refill takes less than 2 seconds.   Neurological:      General: No focal deficit present.      Mental Status: He is alert and oriented to person, place, and time.   Psychiatric:         Mood and Affect: Mood normal.         ED Course          ED Course as of 12/13/23 2019   Wed Dec 13, 2023   1723 Troponin T, High Sensitivity(!!): 391  Has acutely elevated troponin.  He still has mild ongoing left-sided chest pain.  Will obtain repeat EKG and troponin level to assess for active changes.  Cardiology paged for review of EKG.  Patient states that if he requires transfer for cardiology evaluation/treatment, would like to go to [facility].   1731 The case with on-call cardiology, Dr. Redman.  Reviewed history and EKG findings.  Based on the patient's age and described symptoms, high suspicion for cardiac event.  If repeat EKG again shows ST elevations or active changes, recommends calling a STEMI alert for emergent transfer.  Per patient's wishes, contacting the [facility] cardiology group for transfer.  This was paged out as a STEMI alert.   1749 Discussed the case with [facility] cardiologist, Dr. Crawford.  EKGs were faxed to him directly for review.  Patient definitely has acute coronary syndrome and will require transfer, cardiology reviewing studies to assess for STEMI and need for emergent catheterization versus initial medical management.   1801 [facility] cardiology reviewed the EKG, no indication for emergent transfer for STEMI.  Will initiate heparin and also add on Brilinta.  Will also discussed the case with [facility] hospitalist and transfer as soon as possible.   1816 Discussed case with [facility] hospitalist, Dr. Siu.   He agrees to accept admission.  Will work on ground transport as soon as possible.     Procedures         EKG performed at 1629.  Sinus bradycardia, rate 59.  Left axis deviation.  Borderline left bundle branch block with mild ST elevation in V1 and V2, no reciprocal changes.  Otherwise normal intervals.  Nonspecific ST and T wave changes.  Exam independently interpreted by me.    EKG #2 was performed at 1727.  Sinus bradycardia persists, rate 56.  Left axis deviation.  Now appears more consistent with a right bundle branch block with more prominent ST elevation in leads V2 and V3.  Still no reciprocal changes.       Results for orders placed or performed during the hospital encounter of 12/13/23 (from the past 24 hour(s))   CBC with platelets differential    Narrative    The following orders were created for panel order CBC with platelets differential.  Procedure                               Abnormality         Status                     ---------                               -----------         ------                     CBC with platelets and d...[[id number]]  Abnormal            Final result                 Please view results for these tests on the individual orders.   Comprehensive metabolic panel   Result Value Ref Range    Sodium 138 135 - 145 mmol/L    Potassium 4.3 3.4 - 5.3 mmol/L    Carbon Dioxide (CO2) 19 (L) 22 - 29 mmol/L    Anion Gap 17 (H) 7 - 15 mmol/L    Urea Nitrogen 19.3 8.0 - 23.0 mg/dL    Creatinine 0.84 0.67 - 1.17 mg/dL    GFR Estimate 88 >60 mL/min/1.73m2    Calcium 9.5 8.8 - 10.2 mg/dL    Chloride 102 98 - 107 mmol/L    Glucose 149 (H) 70 - 99 mg/dL    Alkaline Phosphatase 92 40 - 150 U/L    AST 64 (H) 0 - 45 U/L    ALT 33 0 - 70 U/L    Protein Total 6.6 6.4 - 8.3 g/dL    Albumin 4.2 3.5 - 5.2 g/dL    Bilirubin Total 1.1 <=1.2 mg/dL   Lipase   Result Value Ref Range    Lipase 22 13 - 60 U/L   Troponin T, High Sensitivity   Result Value Ref Range    Troponin T, High Sensitivity 391 (HH)  <=22 ng/L   CBC with platelets and differential   Result Value Ref Range    WBC Count 11.7 (H) 4.0 - 11.0 10e3/uL    RBC Count 4.29 (L) 4.40 - 5.90 10e6/uL    Hemoglobin 14.0 13.3 - 17.7 g/dL    Hematocrit 39.5 (L) 40.0 - 53.0 %    MCV 92 78 - 100 fL    MCH 32.6 26.5 - 33.0 pg    MCHC 35.4 31.5 - 36.5 g/dL    RDW 12.5 10.0 - 15.0 %    Platelet Count 260 150 - 450 10e3/uL    % Neutrophils 94 %    % Lymphocytes 4 %    % Monocytes 2 %    % Eosinophils 0 %    % Basophils 0 %    % Immature Granulocytes 0 %    NRBCs per 100 WBC 0 <1 /100    Absolute Neutrophils 11.0 (H) 1.6 - 8.3 10e3/uL    Absolute Lymphocytes 0.5 (L) 0.8 - 5.3 10e3/uL    Absolute Monocytes 0.2 0.0 - 1.3 10e3/uL    Absolute Eosinophils 0.0 0.0 - 0.7 10e3/uL    Absolute Basophils 0.0 0.0 - 0.2 10e3/uL    Absolute Immature Granulocytes 0.1 <=0.4 10e3/uL    Absolute NRBCs 0.0 10e3/uL   Troponin T, High Sensitivity   Result Value Ref Range    Troponin T, High Sensitivity 493 (HH) <=22 ng/L   CT Chest Abdomen Pelvis w/o Contrast    Narrative    EXAM: CT CHEST ABDOMEN PELVIS W/O CONTRAST  LOCATION: [facility]  DATE: 12/13/2023    INDICATION: Left chest, left upper quadrant pain, history of hiatal hernia, belching; rule out for hiatal hernia, gastritis, obstruction  COMPARISON: 06/14/2022 chest CT  TECHNIQUE: CT scan of the chest, abdomen, and pelvis was performed without IV contrast. Multiplanar reformats were obtained. Dose reduction techniques were used. Exam limited by lack of intravenous contrast.  CONTRAST: None.    FINDINGS:   LUNGS AND PLEURA: Segmental left upper lobe bronchiectasis is redemonstrated with some associated mucus plugging. There are several small nodules adjacent to the right minor fissure, new in the interval, largest reaching 6 mm, series 4 image 80. There   are several 2 to 4 mm nodules in the periphery of the right upper lobe centered about image 91, likely unchanged. Mild right basilar bronchiectasis.  No pneumothorax or pleural effusions.    MEDIASTINUM/AXILLAE: Interval repair of large hiatal hernia. Long segment mild wall thickening of the distal half of the esophagus.    CORONARY ARTERY CALCIFICATION: Severe.    HEPATOBILIARY: Dependent gallbladder sludge or small stones.    PANCREAS: Unremarkable    SPLEEN: Unremarkable    ADRENAL GLANDS: Mild adrenal hyperplasia, unchanged.    KIDNEYS/BLADDER: No hydronephrosis. Bladder is normal in contour.    BOWEL: No evidence of bowel obstruction. Stomach is moderately distended.    LYMPH NODES: No significant retroperitoneal adenopathy    VASCULATURE: No abdominal aortic aneurysm    PELVIC ORGANS: Moderate prostatic hypertrophy    MUSCULOSKELETAL: Moderately advanced degenerative changes in the right hip.      Impression    IMPRESSION:  1.  Distal esophageal wall thickening favored to represent esophagitis. Clinical correlation and consideration of follow-up barium swallow suggested.  2.  Chronic changes of bronchiectasis and mucous plugging, with several new right upper lobe nodules favored infectious or inflammatory. Attention at 3 month follow-up suggested.  3.  Moderate gastric distention status post hiatal hernia repair.  4.  Additional findings as above.       Medications   iopamidol (ISOVUE-370) solution 500 mL ( Intravenous Canceled Entry 12/13/23 1856)   sodium chloride 0.9 % bag 100 mL for CT scan flush use ( Intravenous Canceled Entry 12/13/23 1857)   nitroGLYcerin (NITRO-BID) 2 % ointment 15 mg (15 mg Transdermal $Patch/Med Applied 12/13/23 1738)   heparin 25,000 units in 0.45% NaCl 250 mL ANTICOAGULANT infusion (950 Units/hr Intravenous $New Bag 12/13/23 1748)   ondansetron (ZOFRAN) injection 4 mg (4 mg Intravenous $Given 12/13/23 1923)   pantoprazole (PROTONIX) IV push injection 40 mg (40 mg Intravenous $Given 12/13/23 1651)   aspirin (ASA) EC tablet 325 mg (325 mg Oral $Given 12/13/23 1738)   heparin loading dose for LOW INTENSITY TREATMENT * Give BEFORE  starting heparin infusion (4,800 Units Intravenous $Given 12/13/23 3345)   ticagrelor (BRILINTA) tablet 180 mg (180 mg Oral $Given 12/13/23 8700)       Assessments & Plan (with Medical Decision Making)     I have reviewed the nursing notes.    I have reviewed the findings, diagnosis, plan and need for follow up with the patient.    Medical Decision Making  Billy Satnana is a 83 year old male with history of H. pylori infection, hiatal hernia s/p surgical repair presents for evaluation of nausea, belching, and left-sided chest discomfort.  Normal vitals on arrival.  Exam was reassuring overall.  He appears somewhat fatigued and uncomfortable but otherwise in no acute distress.  Lung sounds are clear and he has no reproducible chest wall or abdominal tenderness.  Initial symptoms most concerning for GI cause, including recurrence of hiatal hernia, acid reflux, gastritis.  However, based on age and description of pain as mostly located in the chest, differential also includes ACS, pneumothorax.  Very low suspicion for acute aortic pathology given description of symptoms and reassuring exam.  He was given Pepcid.    Initial EKG showed signs of a nonspecific intraventricular block and also subtle ST elevations in V1/V2 without any obvious reciprocal changes.  Troponin returned elevated at 391.  At that time, repeat EKG was obtained and showed some possible progression of ST elevation in V2.  Aspirin was ordered along with heparin drip.  I first consulted with our cardiology team through [facility] and symptoms do sound concerning for ACS.  Patient had previously expressed preference for [facility] if he required transfer and therefore a STEMI alert was called through [facility].  Reviewed EKGs, symptoms with cardiologist, Dr. Crawford.  This does appear to be a recent ischemic event but does not meet STEMI criteria at present.  Patient's pain had subsequently resolved and for this reason we will defer emergent  transfer, instead opting for medical management currently and transfer soon as possible.    Remainder of labs were significant for very mild anemia, minimal elevation in AST, otherwise normal electrolytes and transaminases.  Lipase was within normal limits.  Repeat troponin was further elevated at 493.  We were subsequently able to obtain CT scan of the chest/abdomen/pelvis.  This showed findings of distal esophageal wall thickening which is likely esophagitis, chronic bronchiectasis with new small pulmonary nodules, and moderate gastric distention s/p hiatal hernia repair.  With the elevated troponin, I do feel that his symptoms are cardiac in nature.  He remained hemodynamically stable in our emergency department with no recurrence of chest pain until the time of transport.    Critical care time: 45 minutes.  Patient was critically ill secondary to acute coronary syndrome.  Care required initiation of heparin drip, consultation with specialist for possible STEMI alert arrangement of transport, time spent with family reviewing symptoms and treatment.    New Prescriptions    No medications on file       Final diagnoses:   ACS (acute coronary syndrome) (H)   Elevated troponin       12/13/2023   [facility] EMERGENCY DEPT       Tong Kelly MD  12/13/23 2019

## 2023-12-14 NOTE — MEDICATION SCRIBE - ADMISSION MEDICATION HISTORY
Medication Scribe Admission Medication History    Admission medication history is complete. The information provided in this note is only as accurate as the sources available at the time of the update.    Information Source(s): Patient and spouse via in-person    Pertinent Information:     Changes made to PTA medication list:  Added: Acetaminophen 325 mg   Pepto Bismal   Added as per patient/spouse report   Deleted: metoclopramide 5 mg   Omeprazole 20 mg   Sucralfate 1 g  Deleted as report of no longer taking   Changed: None    Medication Affordability:  Not including over the counter (OTC) medications, was there a time in the past 3 months when you did not take your medications as prescribed because of cost?: No    Allergies reviewed with patient and updates made in EHR: yes    Medication History Completed By: CEM AGEE 12/13/2023 6:51 PM    PTA Med List   Medication Sig Note Last Dose    acetaminophen (TYLENOL) 325 MG tablet Take 325 mg by mouth every 4 hours as needed for fever, headaches or pain  12/13/2023 at 1300    alum & mag hydroxide-simethicone (MAALOX) 200-200-20 MG/5ML SUSP suspension Take 15 mLs by mouth every 4 hours as needed for indigestion 12/13/2023: Took a swig  12/13/2023 at am    bismuth subsalicylate (PEPTO BISMOL) 262 MG/15ML suspension Take 15 mLs by mouth every 6 hours as needed for indigestion 12/13/2023: Took swig  12/13/2023 at am    finasteride (PROSCAR) 5 MG tablet Take 1 tablet (5 mg) by mouth daily  12/13/2023 at am    sildenafil (VIAGRA) 100 MG tablet Take 1 tablet (100 mg) by mouth daily as needed (sex) Don't take tamsulosin at the same time  More than a month at on hand    tamsulosin (FLOMAX) 0.4 MG capsule Take 1 capsule (0.4 mg) by mouth every evening  12/12/2023 at hs

## 2023-12-21 ENCOUNTER — TRANSCRIBE ORDERS (OUTPATIENT)
Dept: OTHER | Age: 83
End: 2023-12-21

## 2023-12-21 DIAGNOSIS — I21.4 NSTEMI (NON-ST ELEVATED MYOCARDIAL INFARCTION) (H): Primary | ICD-10-CM

## 2023-12-27 ENCOUNTER — TRANSCRIBE ORDERS (OUTPATIENT)
Dept: OTHER | Age: 83
End: 2023-12-27

## 2024-01-10 ENCOUNTER — HOSPITAL ENCOUNTER (OUTPATIENT)
Dept: CARDIAC REHAB | Facility: CLINIC | Age: 84
Discharge: HOME | End: 2024-01-10
Attending: INTERNAL MEDICINE
Payer: COMMERCIAL

## 2024-01-10 DIAGNOSIS — I21.4 NSTEMI (NON-ST ELEVATED MYOCARDIAL INFARCTION) (H): ICD-10-CM

## 2024-01-10 PROCEDURE — 93798 PHYS/QHP OP CAR RHAB W/ECG: CPT

## 2024-01-10 PROCEDURE — 93797 PHYS/QHP OP CAR RHAB WO ECG: CPT | Mod: 59

## 2024-01-16 ENCOUNTER — HOSPITAL ENCOUNTER (OUTPATIENT)
Dept: CARDIAC REHAB | Facility: CLINIC | Age: 84
Discharge: HOME | End: 2024-01-16
Attending: INTERNAL MEDICINE
Payer: COMMERCIAL

## 2024-01-16 PROCEDURE — 93798 PHYS/QHP OP CAR RHAB W/ECG: CPT

## 2024-01-18 ENCOUNTER — HOSPITAL ENCOUNTER (OUTPATIENT)
Dept: CARDIAC REHAB | Facility: CLINIC | Age: 84
Discharge: HOME | End: 2024-01-18
Attending: INTERNAL MEDICINE
Payer: COMMERCIAL

## 2024-01-18 PROCEDURE — 93798 PHYS/QHP OP CAR RHAB W/ECG: CPT

## 2024-01-23 ENCOUNTER — HOSPITAL ENCOUNTER (OUTPATIENT)
Dept: CARDIAC REHAB | Facility: CLINIC | Age: 84
Discharge: HOME | End: 2024-01-23
Attending: INTERNAL MEDICINE
Payer: COMMERCIAL

## 2024-01-23 PROCEDURE — 93798 PHYS/QHP OP CAR RHAB W/ECG: CPT

## 2024-01-25 ENCOUNTER — HOSPITAL ENCOUNTER (OUTPATIENT)
Dept: CARDIAC REHAB | Facility: CLINIC | Age: 84
Discharge: HOME | End: 2024-01-25
Attending: INTERNAL MEDICINE
Payer: COMMERCIAL

## 2024-01-25 PROCEDURE — 93798 PHYS/QHP OP CAR RHAB W/ECG: CPT

## 2024-01-29 ENCOUNTER — TRANSCRIBE ORDERS (OUTPATIENT)
Dept: OTHER | Age: 84
End: 2024-01-29

## 2024-01-29 DIAGNOSIS — I25.5 ISCHEMIC CARDIOMYOPATHY: Primary | ICD-10-CM

## 2024-05-25 ENCOUNTER — HEALTH MAINTENANCE LETTER (OUTPATIENT)
Age: 84
End: 2024-05-25

## 2025-06-14 ENCOUNTER — HEALTH MAINTENANCE LETTER (OUTPATIENT)
Age: 85
End: 2025-06-14

## (undated) DEVICE — GLOVE EXAM NITRILE LG

## (undated) DEVICE — TUBING SUCTION 12"X1/4" N612

## (undated) DEVICE — LUBRICATING JELLY 4.25OZ

## (undated) DEVICE — KIT ENDO TURNOVER/PROCEDURE CARRY-ON 101822

## (undated) DEVICE — SOL WATER IRRIG 1000ML BOTTLE 07139-09

## (undated) RX ORDER — PROPOFOL 10 MG/ML
INJECTION, EMULSION INTRAVENOUS
Status: DISPENSED
Start: 2022-06-10